# Patient Record
Sex: MALE | Race: WHITE | Employment: UNEMPLOYED | URBAN - METROPOLITAN AREA
[De-identification: names, ages, dates, MRNs, and addresses within clinical notes are randomized per-mention and may not be internally consistent; named-entity substitution may affect disease eponyms.]

---

## 2017-01-06 ENCOUNTER — OFFICE VISIT (OUTPATIENT)
Dept: INTERNAL MEDICINE CLINIC | Facility: CLINIC | Age: 32
End: 2017-01-06

## 2017-01-06 VITALS
HEART RATE: 88 BPM | WEIGHT: 132 LBS | SYSTOLIC BLOOD PRESSURE: 148 MMHG | RESPIRATION RATE: 18 BRPM | HEIGHT: 70 IN | BODY MASS INDEX: 18.9 KG/M2 | TEMPERATURE: 96 F | DIASTOLIC BLOOD PRESSURE: 81 MMHG

## 2017-01-06 DIAGNOSIS — F43.10 PTSD (POST-TRAUMATIC STRESS DISORDER): ICD-10-CM

## 2017-01-06 DIAGNOSIS — M19.90 ARTHRITIS: ICD-10-CM

## 2017-01-06 DIAGNOSIS — F41.1 GENERALIZED ANXIETY DISORDER: Primary | ICD-10-CM

## 2017-01-06 RX ORDER — DIAZEPAM 10 MG/1
10 TABLET ORAL
Qty: 45 TAB | Refills: 0 | Status: SHIPPED | OUTPATIENT
Start: 2017-01-06 | End: 2017-01-06 | Stop reason: DRUGHIGH

## 2017-01-06 RX ORDER — DIAZEPAM 10 MG/1
TABLET ORAL
Qty: 45 TAB | Refills: 0
Start: 2017-01-06 | End: 2017-02-07 | Stop reason: SDUPTHER

## 2017-01-06 NOTE — MR AVS SNAPSHOT
Visit Information Date & Time Provider Department Dept. Phone Encounter #  
 1/6/2017 10:15 AM Malika Alvarez NP [facility] Internal Medicine [phone] [id number] Follow-up Instructions Return in about 3 months (around 4/6/2017) for Anxiety, Schedule your annual physical with fasting labs. Upcoming Health Maintenance Date Due DTaP/Tdap/Td series (1 - Tdap) 7/29/2005 Allergies as of 1/6/2017  Review Complete On: 1/6/2017 By: Malika Alvarez NP No Known Allergies Current Immunizations  Never Reviewed No immunizations on file. Not reviewed this visit You Were Diagnosed With   
  
 Codes Comments Generalized anxiety disorder    -  Primary ICD-10-CM: F41.1 ICD-9-CM: 300.02 PTSD (post-traumatic stress disorder)     ICD-10-CM: F43.10 ICD-9-CM: 309.81 Vitals BP Pulse Temp Resp Height(growth percentile) Weight(growth percentile) 148/81 (BP 1 Location: Left arm, BP Patient Position: Sitting) 88 96 °F (35.6 °C) (Oral) 18 5' 10\" (1.778 m) 132 lb (59.9 kg) BMI Smoking Status 18.94 kg/m2 Never Smoker Vitals History BMI and BSA Data Body Mass Index Body Surface Area 18.94 kg/m 2 1.72 m 2 Preferred Pharmacy Pharmacy Name Phone [id number] [address] [phone] Your Updated Medication List  
  
   
This list is accurate as of: 1/6/17 10:45 AM.  Always use your most recent med list.  
  
  
  
  
 diazePAM 10 mg tablet Commonly known as:  VALIUM Take 1 Tab by mouth every six (6) hours as needed for Anxiety. Max Daily Amount: 40 mg.  
  
  
  
  
Prescriptions Printed Refills  
 diazePAM (VALIUM) 10 mg tablet 0 Sig: Take 1 Tab by mouth every six (6) hours as needed for Anxiety. Max Daily Amount: 40 mg.  
 Class: Print Route: Oral  
  
Follow-up Instructions Return in about 3 months (around 4/6/2017) for Anxiety, Schedule your annual physical with fasting labs. Introducing [facility]! [employer] introduces [employer] patient portal. Now you can access parts of your medical record, email your doctor's office, and request medication refills online. 1. In your internet browser, go to [email or web address] 2. Click on the First Time User? Click Here link in the Sign In box. You will see the New Member Sign Up page. 3. Enter your [employer] Access Code exactly as it appears below. You will not need to use this code after youve completed the sign-up process. If you do not sign up before the expiration date, you must request a new code. · [employer] Access Code: [email or web address] Expires: 4/6/2017 10:45 AM 
 
4. Enter the last four digits of your Social Security Number (xxxx) and Date of Birth (mm/dd/yyyy) as indicated and click Submit. You will be taken to the next sign-up page. 5. Create a [employer] ID. This will be your [employer] login ID and cannot be changed, so think of one that is secure and easy to remember. 6. Create a [employer] password. You can change your password at any time. 7. Enter your Password Reset Question and Answer. This can be used at a later time if you forget your password. 8. Enter your e-mail address. You will receive e-mail notification when new information is available in [address]. 9. Click Sign Up. You can now view and download portions of your medical record. 10. Click the Download Summary menu link to download a portable copy of your medical information. If you have questions, please visit the Frequently Asked Questions section of the [employer] website. Remember, [employer] is NOT to be used for urgent needs. For medical emergencies, dial 911. Now available from your iPhone and Android! Please provide this summary of care documentation to your next provider. Your primary care clinician is listed as Lenora Youngblood. If you have any questions after today's visit, please call [phone].

## 2017-01-06 NOTE — PROGRESS NOTES
Subjective:      Keyanna Chris is a 27 y.o. male who presents today for anxiety follow up. Mental Health Review  Patient is seen for anxiety disorder. Since last visit: he has been consistently taking valium. Ongoing symptoms include: racing thoughts, feelings of losing control. He denies: SI/SA. Reported side effects from the treatment: none. He states that he feels well controlled on valium and that sometime he feels like he might need to take an additional 5mg. He states he prefers to have the 10mg tablets so he can take half, this way he knows if he has taken it or not. VAPMP reviewed. He notes arthritis to bilateral knees and right wrist.     Patient Active Problem List    Diagnosis Date Noted    PTSD (post-traumatic stress disorder) 10/25/2016    Generalized anxiety disorder 10/25/2016     Current Outpatient Prescriptions   Medication Sig Dispense Refill    diazePAM (VALIUM) 10 mg tablet Take a 0.5 tab as needed for anxiety two times a day. You may take an additional 0.5 tab as needed for days when the anxiety is not well controlled. 45 Tab 0     No Known Allergies  History reviewed. No pertinent past medical history. Family History   Problem Relation Age of Onset    Hypertension Mother     Diabetes Mother     Hypertension Father      Social History   Substance Use Topics    Smoking status: Never Smoker    Smokeless tobacco: Not on file    Alcohol use No        Review of Systems    A comprehensive review of systems was negative except for that written in the HPI.      Objective:     Visit Vitals    /81 (BP 1 Location: Left arm, BP Patient Position: Sitting)    Pulse 88    Temp 96 °F (35.6 °C) (Oral)    Resp 18    Ht 5' 10\" (1.778 m)    Wt 132 lb (59.9 kg)    BMI 18.94 kg/m2     General appearance: alert, cooperative, no distress, appears stated age  Lungs: clear to auscultation bilaterally  Heart: regular rate and rhythm, S1, S2 normal, no murmur, click, rub or gallop  Neurologic: Grossly normal  Psych: bizarre affect, eye contact avoidance. Nursing note and vitals reviewed  Assessment/Plan:   1. Generalized anxiety disorder  - refilled valium with 10mg so he will take 1/2 tabs. He will make apt today with psych  - diazePAM (VALIUM) 10 mg tablet; Take a 0.5 tab as needed for anxiety two times a day. You may take an additional 0.5 tab as needed for days when the anxiety is not well controlled. Dispense: 45 Tab; Refill: 0    2. PTSD (post-traumatic stress disorder)    Follow-up Disposition:  Return in about 3 months (around 4/6/2017) for Anxiety, Schedule your annual physical with fasting labs. Advised him to call back or return to office if symptoms worsen/change/persist.  Discussed expected course/resolution/complications of diagnosis in detail with patient. Medication risks/benefits/costs/interactions/alternatives discussed with patient. He was given an after visit summary which includes diagnoses, current medications, & vitals. He expressed understanding with the diagnosis and plan.

## 2017-01-06 NOTE — PROGRESS NOTES
Chief Complaint   Patient presents with    Anxiety     1. Have you been to the ER, urgent care clinic since your last visit? Hospitalized since your last visit? No    2. Have you seen or consulted any other health care providers outside of the [facility] since your last visit? Include any pap smears or colon screening.  No

## 2017-01-19 ENCOUNTER — TELEPHONE (OUTPATIENT)
Dept: INTERNAL MEDICINE CLINIC | Facility: CLINIC | Age: 32
End: 2017-01-19

## 2017-01-19 NOTE — TELEPHONE ENCOUNTER
Patient called stating that [facility] is currently not accepting any new patients. Therefore, patient needs to be referred to another provider. Please advise!

## 2017-02-03 ENCOUNTER — OFFICE VISIT (OUTPATIENT)
Dept: INTERNAL MEDICINE CLINIC | Facility: CLINIC | Age: 32
End: 2017-02-03

## 2017-02-03 VITALS
TEMPERATURE: 97.4 F | WEIGHT: 130.6 LBS | HEART RATE: 81 BPM | BODY MASS INDEX: 18.7 KG/M2 | HEIGHT: 70 IN | OXYGEN SATURATION: 99 % | SYSTOLIC BLOOD PRESSURE: 111 MMHG | DIASTOLIC BLOOD PRESSURE: 84 MMHG | RESPIRATION RATE: 18 BRPM

## 2017-02-03 DIAGNOSIS — N50.89 GENITAL LESION, MALE: ICD-10-CM

## 2017-02-03 DIAGNOSIS — M19.90 INFLAMMATORY ARTHRITIS: ICD-10-CM

## 2017-02-03 DIAGNOSIS — B35.4 TINEA CORPORIS: ICD-10-CM

## 2017-02-03 DIAGNOSIS — Z00.01 ENCOUNTER FOR GENERAL ADULT MEDICAL EXAMINATION WITH ABNORMAL FINDINGS: Primary | ICD-10-CM

## 2017-02-03 DIAGNOSIS — Z86.19 HISTORY OF HEPATITIS C: ICD-10-CM

## 2017-02-03 DIAGNOSIS — L40.50 PSORIATIC ARTHRITIS (HCC): ICD-10-CM

## 2017-02-03 DIAGNOSIS — L81.5 LEUKODERMA: ICD-10-CM

## 2017-02-03 DIAGNOSIS — M19.90 CHRONIC ARTHRITIS: ICD-10-CM

## 2017-02-03 DIAGNOSIS — Z23 ENCOUNTER FOR IMMUNIZATION: ICD-10-CM

## 2017-02-03 RX ORDER — KETOCONAZOLE 20 MG/G
CREAM TOPICAL DAILY
Qty: 15 G | Refills: 0 | Status: SHIPPED | OUTPATIENT
Start: 2017-02-03

## 2017-02-03 NOTE — PATIENT INSTRUCTIONS
Learning About Living Valdez  What is a living will? A living will is a legal form you use to write down the kind of care you want at the end of your life. It is used by the health professionals who will treat you if you aren't able to decide for yourself. If you put your wishes in writing, your loved ones and others will know what kind of care you want. They won't need to guess. This can ease your mind and be helpful to others. A living will is not the same as an estate or property will. An estate will explains what you want to happen with your money and property after you die. Is a living will a legal document? A living will is a legal document. Each state has its own laws about living almanza. If you move to another state, make sure that your living will is legal in the state where you now live. Or you might use a universal form that has been approved by many states. This kind of form can sometimes be completed and stored online. Your electronic copy will then be available wherever you have a connection to the Internet. In most cases, doctors will respect your wishes even if you have a form from a different state. · You don't need an  to complete a living will. But legal advice can be helpful if your state's laws are unclear, your health history is complicated, or your family can't agree on what should be in your living will. · You can change your living will at any time. Some people find that their wishes about end-of-life care change as their health changes. · In addition to making a living will, think about completing a medical power of  form. This form lets you name the person you want to make end-of-life treatment decisions for you (your \"health care agent\") if you're not able to. Many hospitals and nursing homes will give you the forms you need to complete a living will and a medical power of .   · Your living will is used only if you can't make or communicate decisions for yourself anymore. If you become able to make decisions again, you can accept or refuse any treatment, no matter what you wrote in your living will. · Your state may offer an online registry. This is a place where you can store your living will online so the doctors and nurses who need to treat you can find it right away. What should you think about when creating a living will? Talk about your end-of-life wishes with your family members and your doctor. Let them know what you want. That way the people making decisions for you won't be surprised by your choices. Think about these questions as you make your living will:  · Do you know enough about life support methods that might be used? If not, talk to your doctor so you know what might be done if you can't breathe on your own, your heart stops, or you're unable to swallow. · What things would you still want to be able to do after you receive life-support methods? Would you want to be able to walk? To speak? To eat on your own? To live without the help of machines? · If you have a choice, where do you want to be cared for? In your home? At a hospital or nursing home? · Do you want certain Sikh practices performed if you become very ill? · If you have a choice at the end of your life, where would you prefer to die? At home? In a hospital or nursing home? Somewhere else? · Would you prefer to be buried or cremated? · Do you want your organs to be donated after you die? What should you do with your living will? · Make sure that your family members and your health care agent have copies of your living will. · Give your doctor a copy of your living will to keep in your medical record. If you have more than one doctor, make sure that each one has a copy. · You may want to put a copy of your living will where it can be easily found. Where can you learn more? Go to http://gretta-barrie.info/.   Enter I623 in the search box to learn more about \"Learning About Franklin [employer]. \"  Current as of: February 24, 2016  Content Version: 11.1  © 5375-2648 [employer], [employer]. Care instructions adapted under license by [facility] (which disclaims liability or warranty for this information). If you have questions about a medical condition or this instruction, always ask your healthcare professional. [address] any warranty or liability for your use of this information.

## 2017-02-03 NOTE — PROGRESS NOTES
Subjective:      Elizabeth Us is a 27 y.o. male who presents today for CPE. He had coffee with cream/sugar but states it was a very small amount. He states he also has a history of Hep C that has been treated and cleared. Health Maintenance  Immunizations:    Influenza: patient declines, reviewed pros/cons to vaccination & recommended it. Tetanus: not UTD- will do today. Cancer screening:    Reviewed testicular, prostate, and colon cancer screening guidelines. Hand pain: history of arthritis, hand pain that is chronic and rated 4/10. He does not take medications for this. He states he wants to be tested for RA since his dad and granfather have it. Skin concern: spot on left forearm that is new and elevated, no other sx. Penis color change to shaft that is itching and sometimes burning. Dry skin to left side of forehead that has been present for years. Patient Care Team:  Elena Hawthorne NP as PCP - General (Nurse Practitioner)     The following sections were reviewed & updated as appropriate: PMH, PSH, FH, and SH. Patient Active Problem List   Diagnosis Code    PTSD (post-traumatic stress disorder) F43.10    Generalized anxiety disorder F41.1    Arthritis M19.90          Prior to Admission medications    Medication Sig Start Date End Date Taking? Authorizing Provider   diazePAM (VALIUM) 10 mg tablet Take a 0.5 tab as needed for anxiety two times a day. You may take an additional 0.5 tab as needed for days when the anxiety is not well controlled. 1/6/17  Yes Elena Hawthorne NP          No Known Allergies       Family History   Problem Relation Age of Onset    Hypertension Mother     Diabetes Mother     Hypertension Father      Social History   Substance Use Topics    Smoking status: Never Smoker    Smokeless tobacco: Never Used    Alcohol use No        Review of Systems    A comprehensive review of systems was negative except for that written in the HPI.      Objective: Visit Vitals    /84 (BP 1 Location: Left arm, BP Patient Position: Sitting)    Pulse 81    Temp 97.4 °F (36.3 °C) (Oral)    Resp 18    Ht 5' 10\" (1.778 m)    Wt 130 lb 9.6 oz (59.2 kg)    SpO2 99%    BMI 18.74 kg/m2     General:  Alert, cooperative, no distress, appears stated age. Head:  Normocephalic, without obvious abnormality, atraumatic. Eyes:  Conjunctivae/corneas clear. PERRL, EOMs intact. Ears:  Normal TMs and external ear canals both ears. Nose: Nares normal. Septum midline. Mucosa normal. No drainage or sinus tenderness. Throat: Lips, mucosa, and tongue normal. Teeth and gums normal.   Neck: Supple, symmetrical, trachea midline, no adenopathy, thyroid: no enlargement/tenderness/nodules, no carotid bruit and no JVD. Back:   Symmetric, no curvature. ROM normal. No CVA tenderness. Lungs:   Clear to auscultation bilaterally. Chest wall:  No tenderness or deformity. Heart:  Regular rate and rhythm, S1, S2 normal, no murmur, click, rub or gallop. Abdomen:   Soft, non-tender. Bowel sounds normal. No masses,  No organomegaly. Genitalia:  Normal male circumsized male with hypopigmentation noted to shaft. no ulceration, discharge or tenderness. Rectal:  Deferred   Extremities: Extremities normal, atraumatic, no cyanosis or edema. Pulses: 2+ and symmetric all extremities. Skin: Small about 1mm lesion noted to left forearm, elevated papule with flaking skin, no disharge. Left side of forehead hair line with dry flaking skin   Lymph nodes: Cervical, supraclavicular  nodes normal.   Neurologic: CNII-XII intact. Normal strength, sensation throughout. Nursing note and vitals reviewed  Assessment/Plan:   1. Encounter for general adult medical examination with abnormal findings  - LIPID PANEL  - CBC WITH AUTOMATED DIFF  - METABOLIC PANEL, COMPREHENSIVE    2.  Encounter for immunization  - Tetanus, diphtheria toxoids and acellular pertussis (TDAP) vaccine, in individuals >=7 years, IM    3. Chronic arthritis  - RHEUMATOID FACTOR, QL  - CAESAR QL, W/REFLEX CASCADE    4. Inflammatory arthritis  - RHEUMATOID FACTOR, QL  - CAESAR QL, W/REFLEX CASCADE    5. Tinea corporis  - ketoconazole (NIZORAL) 2 % topical cream; Apply  to affected area daily. Dispense: 15 g; Refill: 0    6. Genital lesion, male  7. Leukoderma  - to penis, he will watch sx and call next week to report changes    8. Psoriatic arthritis ([facility] [address]  - advised high emollient topical treatment    9. History of hepatitis C  - HEPATITIS C AB  - HCV RNA NORM QUALITATIVE    Follow-up Disposition:  Return in about 3 months (around 5/3/2017). Advised him to call back or return to office if symptoms worsen/change/persist.  Discussed expected course/resolution/complications of diagnosis in detail with patient. Medication risks/benefits/costs/interactions/alternatives discussed with patient. He was given an after visit summary which includes diagnoses, current medications, & vitals. He expressed understanding with the diagnosis and plan.

## 2017-02-03 NOTE — PROGRESS NOTES
Room 5    Chief Complaint   Patient presents with    Complete Physical     Patient states he had coffee with cream and sugar     1. Have you been to the ER, urgent care clinic since your last visit? Hospitalized since your last visit? No    2. Have you seen or consulted any other health care providers outside of the [address] [place] [address] since your last visit? Include any pap smears or colon screening. No     Health Maintenance Due   Topic Date Due    DTaP/Tdap/Td series (1 - Tdap) 07/29/2005     Reviewed advance directives/living will. Patient does not have one in place.   Information attached to patient's AVS

## 2017-02-03 NOTE — MR AVS SNAPSHOT
Visit Information Date & Time Provider Department Dept. Phone Encounter #  
 2/3/2017  9:45 AM Tatyana Latham, [address] Internal Medicine [phone] [id number] Follow-up Instructions Return in about 3 months (around 5/3/2017). Upcoming Health Maintenance Date Due DTaP/Tdap/Td series (1 - Tdap) 7/29/2005 Allergies as of 2/3/2017  Review Complete On: 2/3/2017 By: Tatyana Latham NP No Known Allergies Current Immunizations  Never Reviewed Name Date Tdap  Incomplete Not reviewed this visit You Were Diagnosed With   
  
 Codes Comments Encounter for general adult medical examination with abnormal findings    -  Primary ICD-10-CM: Z00.01 
ICD-9-CM: V70.0 Encounter for immunization     ICD-10-CM: G67 ICD-9-CM: V03.89 Chronic arthritis     ICD-10-CM: M19.90 ICD-9-CM: 716.90 Inflammatory arthritis     ICD-10-CM: M19.90 ICD-9-CM: 714.9 Tinea corporis     ICD-10-CM: B35.4 ICD-9-CM: 110.5 Genital lesion, male     ICD-10-CM: N50.89 ICD-9-CM: 608.89 Leukoderma     ICD-10-CM: L81.5 ICD-9-CM: 709.09   
 Psoriatic arthritis ([facility] [address]     ICD-10-CM: L40.50 ICD-9-CM: 696.0 Vitals BP Pulse Temp Resp Height(growth percentile) Weight(growth percentile) 111/84 (BP 1 Location: Left arm, BP Patient Position: Sitting) 81 97.4 °F (36.3 °C) (Oral) 18 5' 10\" (1.778 m) 130 lb 9.6 oz (59.2 kg) SpO2 BMI Smoking Status 99% 18.74 kg/m2 Never Smoker Vitals History BMI and BSA Data Body Mass Index Body Surface Area 18.74 kg/m 2 1.71 m 2 Preferred Pharmacy Pharmacy Name Phone [facility]/PHARMACY #[id number]- [place], [place] - [address] [phone] Your Updated Medication List  
  
   
This list is accurate as of: 2/3/17 11:10 AM.  Always use your most recent med list.  
  
  
  
  
 diazePAM 10 mg tablet Commonly known as:  VALIUM  
 Take a 0.5 tab as needed for anxiety two times a day. You may take an additional 0.5 tab as needed for days when the anxiety is not well controlled. ketoconazole 2 % topical cream  
Commonly known as:  NIZORAL Apply  to affected area daily. Prescriptions Sent to Pharmacy Refills  
 ketoconazole (NIZORAL) 2 % topical cream 0 Sig: Apply  to affected area daily. Class: Normal  
 Pharmacy: [facility]/pharmacy [address] [facility] [address]  #: [phone] Route: Topical  
  
We Performed the Following CAESAR QL, W/REFLEX CASCADE [[id number] Custom] CBC WITH AUTOMATED DIFF [08823 CPT(R)] LIPID PANEL [69238 CPT(R)] METABOLIC PANEL, COMPREHENSIVE [97942 CPT(R)] RHEUMATOID FACTOR, QL [id number] CPT(R)] TETANUS, DIPHTHERIA TOXOIDS AND ACELLULAR PERTUSSIS VACCINE (TDAP), IN INDIVIDS. >=7, IM [id number] CPT(R)] Follow-up Instructions Return in about 3 months (around 5/3/2017). Patient Instructions Dorita Sin 1721 What is a living will? A living will is a legal form you use to write down the kind of care you want at the end of your life. It is used by the health professionals who will treat you if you aren't able to decide for yourself. If you put your wishes in writing, your loved ones and others will know what kind of care you want. They won't need to guess. This can ease your mind and be helpful to others. A living will is not the same as an estate or property will. An estate will explains what you want to happen with your money and property after you die. Is a living will a legal document? A living will is a legal document. Each state has its own laws about living almanza. If you move to another state, make sure that your living will is legal in the state where you now live. Or you might use a universal form that has been approved by many states.  This kind of form can sometimes be completed and stored online. Your electronic copy will then be available wherever you have a connection to the Internet. In most cases, doctors will respect your wishes even if you have a form from a different state. · You don't need an  to complete a living will. But legal advice can be helpful if your state's laws are unclear, your health history is complicated, or your family can't agree on what should be in your living will. · You can change your living will at any time. Some people find that their wishes about end-of-life care change as their health changes. · In addition to making a living will, think about completing a medical power of  form. This form lets you name the person you want to make end-of-life treatment decisions for you (your \"health care agent\") if you're not able to. Many hospitals and nursing homes will give you the forms you need to complete a living will and a medical power of . · Your living will is used only if you can't make or communicate decisions for yourself anymore. If you become able to make decisions again, you can accept or refuse any treatment, no matter what you wrote in your living will. · Your state may offer an online registry. This is a place where you can store your living will online so the doctors and nurses who need to treat you can find it right away. What should you think about when creating a living will? Talk about your end-of-life wishes with your family members and your doctor. Let them know what you want. That way the people making decisions for you won't be surprised by your choices. Think about these questions as you make your living will: · Do you know enough about life support methods that might be used? If not, talk to your doctor so you know what might be done if you can't breathe on your own, your heart stops, or you're unable to swallow.  
· What things would you still want to be able to do after you receive life-support methods? Would you want to be able to walk? To speak? To eat on your own? To live without the help of machines? · If you have a choice, where do you want to be cared for? In your home? At a hospital or nursing home? · Do you want certain Cheondoism practices performed if you become very ill? · If you have a choice at the end of your life, where would you prefer to die? At home? In a hospital or nursing home? Somewhere else? · Would you prefer to be buried or cremated? · Do you want your organs to be donated after you die? What should you do with your living will? · Make sure that your family members and your health care agent have copies of your living will. · Give your doctor a copy of your living will to keep in your medical record. If you have more than one doctor, make sure that each one has a copy. · You may want to put a copy of your living will where it can be easily found. Where can you learn more? Go to http://gretta-barrie.info/. Enter R235 in the search box to learn more about \"Learning About Living Perroy. \" Current as of: February 24, 2016 Content Version: 11.1 © 3278-6253 [employer], [employer]. Care instructions adapted under license by [employer] (which disclaims liability or warranty for this information). If you have questions about a medical condition or this instruction, always ask your healthcare professional. [place] [address] any warranty or liability for your use of this information. Introducing [facility] & HEALTH SERVICES! [facility] introduces ZoomInfo patient portal. Now you can access parts of your medical record, email your doctor's office, and request medication refills online. 1. In your internet browser, go to [email or web address] 2. Click on the First Time User? Click Here link in the Sign In box. You will see the New Member Sign Up page. 3. Enter your Ourcast Access Code exactly as it appears below. You will not need to use this code after youve completed the sign-up process. If you do not sign up before the expiration date, you must request a new code. · Ourcast Access Code: [email or web address] Expires: 4/6/2017 10:45 AM 
 
4. Enter the last four digits of your Social Security Number (xxxx) and Date of Birth (mm/dd/yyyy) as indicated and click Submit. You will be taken to the next sign-up page. 5. Create a Ourcast ID. This will be your Ourcast login ID and cannot be changed, so think of one that is secure and easy to remember. 6. Create a Ourcast password. You can change your password at any time. 7. Enter your Password Reset Question and Answer. This can be used at a later time if you forget your password. 8. Enter your e-mail address. You will receive e-mail notification when new information is available in [address] 9. Click Sign Up. You can now view and download portions of your medical record. 10. Click the Download Summary menu link to download a portable copy of your medical information. If you have questions, please visit the Frequently Asked Questions section of the Ourcast website. Remember, Ourcast is NOT to be used for urgent needs. For medical emergencies, dial 911. Now available from your iPhone and Android! Please provide this summary of care documentation to your next provider. Your primary care clinician is listed as Humberto Cleveland. If you have any questions after today's visit, please call [phone].

## 2017-02-07 DIAGNOSIS — F41.1 GENERALIZED ANXIETY DISORDER: ICD-10-CM

## 2017-02-08 RX ORDER — DIAZEPAM 10 MG/1
TABLET ORAL
Qty: 45 TAB | Refills: 0 | Status: SHIPPED | OUTPATIENT
Start: 2017-02-08 | End: 2017-03-13 | Stop reason: SDUPTHER

## 2017-03-01 ENCOUNTER — OFFICE VISIT (OUTPATIENT)
Dept: INTERNAL MEDICINE CLINIC | Facility: CLINIC | Age: 32
End: 2017-03-01

## 2017-03-01 VITALS
HEIGHT: 70 IN | HEART RATE: 91 BPM | BODY MASS INDEX: 18.61 KG/M2 | TEMPERATURE: 98.2 F | RESPIRATION RATE: 18 BRPM | DIASTOLIC BLOOD PRESSURE: 71 MMHG | SYSTOLIC BLOOD PRESSURE: 109 MMHG | WEIGHT: 130 LBS

## 2017-03-01 DIAGNOSIS — K04.7 DENTAL ABSCESS: Primary | ICD-10-CM

## 2017-03-01 DIAGNOSIS — K08.89 PAIN, DENTAL: ICD-10-CM

## 2017-03-01 DIAGNOSIS — L98.9 SKIN LESION: ICD-10-CM

## 2017-03-01 RX ORDER — AMOXICILLIN AND CLAVULANATE POTASSIUM 875; 125 MG/1; MG/1
1 TABLET, FILM COATED ORAL EVERY 12 HOURS
Qty: 14 TAB | Refills: 0 | Status: SHIPPED | OUTPATIENT
Start: 2017-03-01 | End: 2017-05-02

## 2017-03-01 RX ORDER — OXYCODONE AND ACETAMINOPHEN 5; 325 MG/1; MG/1
1 TABLET ORAL
Qty: 15 TAB | Refills: 0 | Status: SHIPPED | OUTPATIENT
Start: 2017-03-01 | End: 2017-05-02

## 2017-03-01 NOTE — MR AVS SNAPSHOT
Visit Information Date & Time Provider Department Dept. Phone Encounter #  
 3/1/2017  2:30 PM Phillip Salinas NP [facility] Internal Medicine [phone] [id number] Follow-up Instructions Return if symptoms worsen or fail to improve. Upcoming Health Maintenance Date Due DTaP/Tdap/Td series (2 - Td) 2/3/2027 Allergies as of 3/1/2017  Review Complete On: 3/1/2017 By: Phillip Salinas NP No Known Allergies Current Immunizations  Reviewed on 2/3/2017 Name Date Tdap 2/3/2017 Not reviewed this visit You Were Diagnosed With   
  
 Codes Comments Dental abscess    -  Primary ICD-10-CM: K04.7 ICD-9-CM: 522.5 Pain, dental     ICD-10-CM: S67.25 ICD-9-CM: 525.9 Vitals BP  
  
  
  
  
  
 109/71 (BP 1 Location: Left arm, BP Patient Position: Sitting) Vitals History BMI and BSA Data Body Mass Index Body Surface Area  
 18.65 kg/m 2 1.71 m 2 Preferred Pharmacy Pharmacy Name Phone [facility]/PHARMACY #[id number] [address] [phone] Your Updated Medication List  
  
   
This list is accurate as of: 3/1/17  3:06 PM.  Always use your most recent med list.  
  
  
  
  
 amoxicillin-clavulanate 875-125 mg per tablet Commonly known as:  AUGMENTIN Take 1 Tab by mouth every twelve (12) hours. diazePAM 10 mg tablet Commonly known as:  VALIUM Take a 0.5 tab as needed for anxiety two times a day. You may take an additional 0.5 tab as needed for days when the anxiety is not well controlled. ketoconazole 2 % topical cream  
Commonly known as:  NIZORAL Apply  to affected area daily. oxyCODONE-acetaminophen 5-325 mg per tablet Commonly known as:  PERCOCET Take 1 Tab by mouth every four (4) hours as needed for Pain. Max Daily Amount: 6 Tabs. Prescriptions Printed Refills oxyCODONE-acetaminophen (PERCOCET) 5-325 mg per tablet 0 Sig: Take 1 Tab by mouth every four (4) hours as needed for Pain. Max Daily Amount: 6 Tabs. Class: Print Route: Oral  
  
Prescriptions Sent to Pharmacy Refills  
 amoxicillin-clavulanate (AUGMENTIN) 875-125 mg per tablet 0 Sig: Take 1 Tab by mouth every twelve (12) hours. Class: Normal  
 Pharmacy: [facility]/pharmacy [address] [facility] [address]  #: [phone] Route: Oral  
  
Follow-up Instructions Return if symptoms worsen or fail to improve. Patient Instructions [employer] C/ [place] [address]: [phone] 
-there is a cost for the appointment which covers an xray and exam. Acceptance 
into the clinic is determined by the faculty on the needs of the patient and 
educational needs of the student. Cost is less than private practice and payment 
is expected at the time of treatment. No sliding financial scale is available. [facility].: [phone] 
-this clinic only does teeth extraction. Payment is dependent on financial 
screening and a sliding scale of what the patient can afford. Daily Plant: [phone] [facility] [phone] 
-multiple treatment options. Payment is based on a persons income and what 
they can afford to pay. [address] [place] [address] Avenue: [phone] 
-multiple treatment options. Payment is based on financial screening and a 
sliding scale of what the patient can afford. [facility]: [phone] or [phone] 
Tyler Garner, [address] [facility] Only does extractions, partials, and dentures. Reasonable rates. Anywhere in the state: 
[facility]: [phone] 
-Only for patients >62 or those that are on disability [place]: ([address] [facility] [address] [facility]) Mehdi: [phone] 
-[address] [facility]. Patients must live in the [facility] 
 Art Mendoza, or [place]. Willie: ([address]) [facility]: [phone] 
-only for residents of the [facility] area and must be patients of the medical 
clinic in the same area. [place]: ([address]) Erin Low 
-based on financial screening and a sliding scale of what the patient can afford. [place]: ([address]) [address] [place] [address] Adult Dental Clinic: [phone] [facility]: ([address]) [address] [place] [address] Avenue: [phone] 
-this clinic only does extractions (removal) and restorations (fillings) [place]: ([facility] [address]) [facility]: [phone] Abscessed Tooth: Care Instructions Your Care Instructions An abscessed tooth is a tooth that has a pocket of pus in the tissues around it. Pus forms when the body tries to fight an infection caused by bacteria. If the pus cannot drain, it forms an abscess. An abscessed tooth can cause red, swollen gums and throbbing pain, especially when you chew. You may have a bad taste in your mouth and a fever, and your jaw may swell. Damage to the tooth, untreated tooth decay, or gum disease can cause an abscessed tooth. An abscessed tooth needs to be treated by a dental professional right away. If it is not treated, the infection could spread to other parts of your body. Your dentist will give you antibiotics to stop the infection. He or she may make a hole in the tooth or cut open (ramón) the abscess inside your mouth so that the infection can drain, which should relieve your pain. You may need to have a root canal treatment, which tries to save your tooth by taking out the infected pulp and replacing it with a healing medicine and/or a filling. If these treatments do not work, your tooth may have to be removed. Follow-up care is a key part of your treatment and safety.  Be sure to make and go to all appointments, and call your doctor if you are having problems. It's also a good idea to know your test results and keep a list of the medicines you take. How can you care for yourself at home? · Reduce pain and swelling in your face and jaw by putting ice or a cold pack on the outside of your cheek for 10 to 20 minutes at a time. Put a thin cloth between the ice and your skin. · Take pain medicines exactly as directed. ¨ If the doctor gave you a prescription medicine for pain, take it as prescribed. ¨ If you are not taking a prescription pain medicine, ask your doctor if you can take an over-the-counter medicine. · Take your antibiotics as directed. Do not stop taking them just because you feel better. You need to take the full course of antibiotics. To prevent tooth abscess · Brush and floss every day, and have regular dental checkups. · Eat a healthy diet, and avoid sugary foods and drinks. · Do not smoke or use spit tobacco. Tobacco use slows your ability to heal. It also increases your risk for gum disease and cancer of the mouth and throat. If you need help quitting, talk to your doctor about stop-smoking programs and medicines. These can increase your chances of quitting for good. When should you call for help? Call 911 anytime you think you may need emergency care. For example, call if: 
· You have trouble breathing. Call your doctor now or seek immediate medical care if: 
· You are dizzy or lightheaded, or you feel like you may faint. · You have a new or higher fever. · You have swelling, redness, or pain that spreads or gets worse. · You have pus coming from the tooth area. · You have an earache or pain behind your ear. · You have a fever with a stiff neck or a severe headache. · You are sensitive to light or feel very sleepy or confused. Watch closely for changes in your health, and be sure to contact your doctor if: 
· You do not get better as expected. Where can you learn more? Go to http://gretta-barrie.info/. Enter F940 in the search box to learn more about \"Abscessed Tooth: Care Instructions. \" Current as of: August 9, 2016 Content Version: 11.1 © 7799-8954 [employer] Universe, Incorporated. Care instructions adapted under license by [employer] (which disclaims liability or warranty for this information). If you have questions about a medical condition or this instruction, always ask your healthcare professional. [address] any warranty or liability for your use of this information. Introducing [facility] & HEALTH SERVICES! [facility] introduces [employer]P [employer] patient portal. Now you can access parts of your medical record, email your doctor's office, and request medication refills online. 1. In your internet browser, go to [email or web address] 2. Click on the First Time User? Click Here link in the Sign In box. You will see the New Member Sign Up page. 3. Enter your [employer] Access Code exactly as it appears below. You will not need to use this code after youve completed the sign-up process. If you do not sign up before the expiration date, you must request a new code. · [employer] Access Code: [email or web address] Expires: 4/6/2017 10:45 AM 
 
4. Enter the last four digits of your Social Security Number (xxxx) and Date of Birth (mm/dd/yyyy) as indicated and click Submit. You will be taken to the next sign-up page. 5. Create a SmartExposeet ID. This will be your K[employer]P [employer] login ID and cannot be changed, so think of one that is secure and easy to remember. 6. Create a [employer] password. You can change your password at any time. 7. Enter your Password Reset Question and Answer. This can be used at a later time if you forget your password. 8. Enter your e-mail address. You will receive e-mail notification when new information is available in [address] 9. Click Sign Up. You can now view and download portions of your medical record. 10. Click the Download Summary menu link to download a portable copy of your medical information. If you have questions, please visit the Frequently Asked Questions section of the FindTheBest website. Remember, FindTheBest is NOT to be used for urgent needs. For medical emergencies, dial 911. Now available from your iPhone and Android! Please provide this summary of care documentation to your next provider. Your primary care clinician is listed as Keshave Presser. If you have any questions after today's visit, please call [phone].

## 2017-03-01 NOTE — PATIENT INSTRUCTIONS
[address]: [phone]  -there is a cost for the appointment which covers an xray and exam. Acceptance  into the clinic is determined by the faculty on the needs of the patient and  educational needs of the student. Cost is less than private practice and payment  is expected at the time of treatment. No sliding financial scale is available. [address] [facility] [phone]  -this clinic only does teeth extraction. Payment is dependent on financial  screening and a sliding scale of what the patient can afford. [facility]: [phone] -[address] [facility]    Mell[address].: [phone]  -multiple treatment options. Payment is based on a persons income and what  they can afford to pay. [address]: [phone]  -multiple treatment options. Payment is based on financial screening and a  sliding scale of what the patient can afford. [facility]: [phone] or [phone]  [place], [place]. Only does extractions, partials, and dentures. Reasonable rates. Anywhere in the state:  [facility]: [phone]  -Only for patients >62 or those that are on disability  Louise: ([address] [facility])  [facility]: [phone]  -[address] Patients must live in the [facility], or [facility]. [place]wick: ([address])  [address]: [phone]  -only for residents of the [facility] area and must be patients of the medical  clinic in the same area. David: ([address])  [address]  -based on financial screening and a sliding scale of what the patient can afford.   Joan: ([address])  [address] [facility]: [address]: ([address])  [address]: [phone]  -this clinic only does extractions (removal) and restorations (fillings)  Ulysses: [facility] [address] [facility])  [facility] [place] - INPATIENT: [phone]       Abscessed Tooth: Care Instructions  Your Care Instructions    An abscessed tooth is a tooth that has a pocket of pus in the tissues around it. Pus forms when the body tries to fight an infection caused by bacteria. If the pus cannot drain, it forms an abscess. An abscessed tooth can cause red, swollen gums and throbbing pain, especially when you chew. You may have a bad taste in your mouth and a fever, and your jaw may swell. Damage to the tooth, untreated tooth decay, or gum disease can cause an abscessed tooth. An abscessed tooth needs to be treated by a dental professional right away. If it is not treated, the infection could spread to other parts of your body. Your dentist will give you antibiotics to stop the infection. He or she may make a hole in the tooth or cut open (ramón) the abscess inside your mouth so that the infection can drain, which should relieve your pain. You may need to have a root canal treatment, which tries to save your tooth by taking out the infected pulp and replacing it with a healing medicine and/or a filling. If these treatments do not work, your tooth may have to be removed. Follow-up care is a key part of your treatment and safety. Be sure to make and go to all appointments, and call your doctor if you are having problems. It's also a good idea to know your test results and keep a list of the medicines you take. How can you care for yourself at home? · Reduce pain and swelling in your face and jaw by putting ice or a cold pack on the outside of your cheek for 10 to 20 minutes at a time. Put a thin cloth between the ice and your skin. · Take pain medicines exactly as directed. ¨ If the doctor gave you a prescription medicine for pain, take it as prescribed. ¨ If you are not taking a prescription pain medicine, ask your doctor if you can take an over-the-counter medicine. · Take your antibiotics as directed.  Do not stop taking them just because you feel better. You need to take the full course of antibiotics. To prevent tooth abscess  · Brush and floss every day, and have regular dental checkups. · Eat a healthy diet, and avoid sugary foods and drinks. · Do not smoke or use spit tobacco. Tobacco use slows your ability to heal. It also increases your risk for gum disease and cancer of the mouth and throat. If you need help quitting, talk to your doctor about stop-smoking programs and medicines. These can increase your chances of quitting for good. When should you call for help? Call 911 anytime you think you may need emergency care. For example, call if:  · You have trouble breathing. Call your doctor now or seek immediate medical care if:  · You are dizzy or lightheaded, or you feel like you may faint. · You have a new or higher fever. · You have swelling, redness, or pain that spreads or gets worse. · You have pus coming from the tooth area. · You have an earache or pain behind your ear. · You have a fever with a stiff neck or a severe headache. · You are sensitive to light or feel very sleepy or confused. Watch closely for changes in your health, and be sure to contact your doctor if:  · You do not get better as expected. Where can you learn more? Go to http://gretta-barrie.info/. Enter O010 in the search box to learn more about \"Abscessed Tooth: Care Instructions. \"  Current as of: August 9, 2016  Content Version: 11.1  © 3482-5449 [employer]. Care instructions adapted under license by [employer] (which disclaims liability or warranty for this information). If you have questions about a medical condition or this instruction, always ask your healthcare professional. [place] [address] any warranty or liability for your use of this information.

## 2017-03-01 NOTE — PROGRESS NOTES
Chief Complaint   Patient presents with    Dental Pain     1. Have you been to the ER, urgent care clinic since your last visit? Hospitalized since your last visit? No    2. Have you seen or consulted any other health care providers outside of the [address] [place] [address] since your last visit? Include any pap smears or colon screening.  No

## 2017-03-01 NOTE — PROGRESS NOTES
Subjective:      Kaleigh Zhou is a 27 y.o. male who presents today for teeth decay. Teeth decay: pain is rated 8/10, he states he has had 2 days of pain. He states there is swelling and throbbing. He is taking 4 ibuprofen and it hasn't helped the pain. He states the location is lower jaw \"under tongue\". He has tried using homeopathic therapies including clove oil, this has also not helped. He denies fevers but has a headache. Patient Active Problem List    Diagnosis Date Noted    History of hepatitis C 02/03/2017    Arthritis 01/06/2017    PTSD (post-traumatic stress disorder) 10/25/2016    Generalized anxiety disorder 10/25/2016     Current Outpatient Prescriptions   Medication Sig Dispense Refill    diazePAM (VALIUM) 10 mg tablet Take a 0.5 tab as needed for anxiety two times a day. You may take an additional 0.5 tab as needed for days when the anxiety is not well controlled. 45 Tab 0    ketoconazole (NIZORAL) 2 % topical cream Apply  to affected area daily. 15 g 0     No Known Allergies  History reviewed. No pertinent past medical history. Family History   Problem Relation Age of Onset    Hypertension Mother     Diabetes Mother     Hypertension Father      Social History   Substance Use Topics    Smoking status: Never Smoker    Smokeless tobacco: Never Used    Alcohol use No        Review of Systems    A comprehensive review of systems was negative except for that written in the HPI. Objective:     Visit Vitals    /71 (BP 1 Location: Left arm, BP Patient Position: Sitting)    Pulse 91    Temp 98.2 °F (36.8 °C) (Oral)    Resp 18    Ht 5' 10\" (1.778 m)    Wt 130 lb (59 kg)    BMI 18.65 kg/m2     General appearance: alert, cooperative, appears in pain, appears stated age  Throat: abscess clearly visible on bottom row front teeth, interior gums affected.  Active pus drainage  Lungs: clear to auscultation bilaterally  Heart: regular rate and rhythm, S1, S2 normal, no murmur, click, rub or gallop  Neurologic: Grossly normal  Nursing note and vitals reviewed  Assessment/Plan:       ICD-10-CM ICD-9-CM    1. Dental abscess K04.7 522.5 amoxicillin-clavulanate (AUGMENTIN) 875-125 mg per tablet   2. Pain, dental K08.89 525.9 oxyCODONE-acetaminophen (PERCOCET) 5-325 mg per tablet   3. Skin lesion L98.9 709.9      Patient was given dental resources to contact asap. Small skin lesions on bilateral arms (2 total) seems improved since last visit but patient disagrees. He will continue antifungal 2 more weeks and will call if no improvement. Advised him to call back or return to office if symptoms worsen/change/persist.  Discussed expected course/resolution/complications of diagnosis in detail with patient. Medication risks/benefits/costs/interactions/alternatives discussed with patient. He was given an after visit summary which includes diagnoses, current medications, & vitals. He expressed understanding with the diagnosis and plan.

## 2017-03-13 ENCOUNTER — TELEPHONE (OUTPATIENT)
Dept: INTERNAL MEDICINE CLINIC | Facility: CLINIC | Age: 32
End: 2017-03-13

## 2017-03-13 DIAGNOSIS — F41.1 GENERALIZED ANXIETY DISORDER: ICD-10-CM

## 2017-03-13 RX ORDER — DIAZEPAM 10 MG/1
TABLET ORAL
Qty: 45 TAB | Refills: 0 | Status: SHIPPED | OUTPATIENT
Start: 2017-03-13 | End: 2017-04-10 | Stop reason: SDUPTHER

## 2017-05-02 ENCOUNTER — OFFICE VISIT (OUTPATIENT)
Dept: INTERNAL MEDICINE CLINIC | Facility: CLINIC | Age: 32
End: 2017-05-02

## 2017-05-02 VITALS
SYSTOLIC BLOOD PRESSURE: 117 MMHG | TEMPERATURE: 98.7 F | WEIGHT: 129 LBS | HEART RATE: 89 BPM | DIASTOLIC BLOOD PRESSURE: 79 MMHG | BODY MASS INDEX: 18.47 KG/M2 | HEIGHT: 70 IN | RESPIRATION RATE: 18 BRPM

## 2017-05-02 DIAGNOSIS — G44.201 ACUTE INTRACTABLE TENSION-TYPE HEADACHE: Primary | ICD-10-CM

## 2017-05-02 DIAGNOSIS — F43.10 PTSD (POST-TRAUMATIC STRESS DISORDER): ICD-10-CM

## 2017-05-02 DIAGNOSIS — L23.7 POISON IVY DERMATITIS: ICD-10-CM

## 2017-05-02 RX ORDER — CYCLOBENZAPRINE HCL 5 MG
5 TABLET ORAL
Qty: 20 TAB | Refills: 0 | Status: SHIPPED | OUTPATIENT
Start: 2017-05-02 | End: 2017-06-08

## 2017-05-02 RX ORDER — TRIAMCINOLONE ACETONIDE 1 MG/G
OINTMENT TOPICAL 2 TIMES DAILY
Qty: 30 G | Refills: 0 | Status: SHIPPED | OUTPATIENT
Start: 2017-05-02 | End: 2017-06-08

## 2017-05-02 RX ORDER — SUMATRIPTAN 50 MG/1
50 TABLET, FILM COATED ORAL
Qty: 20 TAB | Refills: 0 | Status: SHIPPED | OUTPATIENT
Start: 2017-05-02 | End: 2017-06-08

## 2017-05-02 NOTE — PROGRESS NOTES
Chief Complaint   Patient presents with    Head Pain     been having a constant headache for the last 4 days even when he awakens in the middle of the night. 1. Have you been to the ER, urgent care clinic since your last visit? Hospitalized since your last visit? No    2. Have you seen or consulted any other health care providers outside of the [address] [place] [address] since your last visit? Include any pap smears or colon screening.  No

## 2017-05-02 NOTE — PROGRESS NOTES
Subjective:      Damián Santos is a 27 y.o. male who presents today for headaches. Neurological Review  He is here today to talk about headaches. This is a new problem, he has a history of migraines but states this one is as severe as those. This episode started 4 days ago, he states that photosensitivity started today. He states the pain is to the front of the head, back of head, and trapezius, he believes it may be a tension headache. The pain is described as sharp pain, he states with heat it becomes dull and achy. Pain rated 7/10. Associated symptoms include: nausea, photosensitivity. Precipitating factors are: stress. He denies a history of recent head injury. Treatments for headaches currently include: ibuprofen 800mg, he states it helped it a little but it has not taken it away. Poison ivy: he has itching rash to bilateral arms that started 6-7 days ago after pulling a vine from the yard. He denies spreading, pain. PTSD: he was seeing a therapist but was not able to schedule a follow up apt so requests to see someone else. Patient Active Problem List    Diagnosis Date Noted    History of hepatitis C 02/03/2017    Arthritis 01/06/2017    PTSD (post-traumatic stress disorder) 10/25/2016    Generalized anxiety disorder 10/25/2016     Current Outpatient Prescriptions   Medication Sig Dispense Refill    diazePAM (VALIUM) 10 mg tablet Take a 0.5 tab as needed for anxiety two times a day. You may take an additional 0.5 tab as needed for days when the anxiety is not well controlled. 45 Tab 0    ketoconazole (NIZORAL) 2 % topical cream Apply  to affected area daily. 15 g 0     No Known Allergies  History reviewed. No pertinent past medical history.   Family History   Problem Relation Age of Onset    Hypertension Mother     Diabetes Mother     Hypertension Father      Social History   Substance Use Topics    Smoking status: Never Smoker    Smokeless tobacco: Never Used    Alcohol use No        Review of Systems    A comprehensive review of systems was negative except for that written in the HPI. Objective:     Visit Vitals    /79 (BP 1 Location: Right arm, BP Patient Position: Sitting)    Pulse 89    Temp 98.7 °F (37.1 °C) (Oral)    Resp 18    Ht 5' 10\" (1.778 m)    Wt 129 lb (58.5 kg)    BMI 18.51 kg/m2     General appearance: alert, cooperative, no distress, appears stated age  Head: Normocephalic, without obvious abnormality, atraumatic  Eyes: conjunctivae/corneas clear. PERRL, EOM's intact. Fundi benign  Ears: normal TM's and external ear canals AU  Nose: Nares normal. Septum midline. Mucosa normal. No drainage or sinus tenderness. Throat: Lips, mucosa, and tongue normal. Teeth and gums normal  Neck: supple, symmetrical, trachea midline, no adenopathy and tight trapezius  Lungs: clear to auscultation bilaterally  Heart: regular rate and rhythm, S1, S2 normal, no murmur, click, rub or gallop  Skin: maclopapular lesions to right forearms, some vesicular  Neurologic: Alert and oriented X 3, normal strength and tone. Normal coordination and gait  Psych: appropriate mood, speech, affect  Nursing note and vitals reviewed  Assessment/Plan:       ICD-10-CM ICD-9-CM    1. Acute intractable tension-type headache G44.201 339.10 SUMAtriptan (IMITREX) 50 mg tablet      cyclobenzaprine (FLEXERIL) 5 mg tablet   2. PTSD (post-traumatic stress disorder) F43.10 309.81 REFERRAL TO BEHAVIORAL HEALTH   3. Poison ivy dermatitis L23.7 692.6 triamcinolone acetonide (KENALOG) 0.1 % ointment     Follow-up Disposition:  Return if symptoms worsen or fail to improve. Advised him to call back or return to office if symptoms worsen/change/persist.  Discussed expected course/resolution/complications of diagnosis in detail with patient. Medication risks/benefits/costs/interactions/alternatives discussed with patient.   He was given an after visit summary which includes diagnoses, current medications, & vitals. He expressed understanding with the diagnosis and plan.

## 2017-05-02 NOTE — PATIENT INSTRUCTIONS
Tension Headache: Care Instructions  Your Care Instructions  Most headaches are tension headaches. These headaches tend to happen again, especially if you are under stress. A tension headache may cause pain or a feeling of pressure all over your head. You probably can't pinpoint the center of the pain. If you keep getting tension headaches, the best thing you can do to limit them is to find out what is causing them and then make changes in those areas. Follow-up care is a key part of your treatment and safety. Be sure to make and go to all appointments, and call your doctor if you are having problems. Its also a good idea to know your test results and keep a list of the medicines you take. How can you care for yourself at home? · Rest in a quiet, dark room with a cool cloth on your forehead until your headache is gone. Close your eyes, and try to relax or go to sleep. Don't watch TV or read. Avoid using the computer. · Use a warm, moist towel or a heating pad set on low to relax tight shoulder and neck muscles. · Have someone gently massage your neck and shoulders. · Take pain medicines exactly as directed. ¨ If the doctor gave you a prescription medicine for pain, take it as prescribed. ¨ If you are not taking a prescription pain medicine, ask your doctor if you can take an over-the-counter medicine. · Be careful not to take pain medicine more often than the instructions allow, because you may get worse or more frequent headaches when the medicine wears off. · If you get another tension headache, stop what you are doing and sit quietly for a moment. Close your eyes and breathe slowly. Try to relax your head and neck muscles. · Do not ignore new symptoms that occur with a headache, such as fever, weakness or numbness, vision changes, or confusion. These may be signs of a more serious problem. To help prevent headaches  · Keep a headache diary so you can figure out what triggers your headaches. Avoiding triggers may help you prevent headaches. Record when each headache began, how long it lasted, and what the pain was like (throbbing, aching, stabbing, or dull). List anything that may have triggered the headache, such as being physically or emotionally stressed or being anxious or depressed. Other possible triggers are hunger, anger, fatigue, poor posture, and muscle strain. · Find healthy ways to deal with stress. Headaches are most common during or right after stressful times. Take time to relax before and after you do something that has caused a headache in the past.  · Exercise daily to relieve stress. Relaxation exercises may help reduce tension. · Get plenty of sleep. · Eat regularly and well. Long periods without food can trigger a headache. · Treat yourself to a massage. Some people find that massages are very helpful in relieving tension. · Try to keep your muscles relaxed by keeping good posture. Check your jaw, face, neck, and shoulder muscles for tension, and try to relax them. When sitting at a desk, change positions often, and stretch for 30 seconds each hour. · Reduce eyestrain from computers by blinking frequently and looking away from the computer screen every so often. Make sure you have proper eyewear and that your monitor is set up properly, about an arms length away. When should you call for help? Call 911 anytime you think you may need emergency care. For example, call if:  · You have signs of a stroke. These may include:  ¨ Sudden numbness, paralysis, or weakness in your face, arm, or leg, especially on only one side of your body. ¨ Sudden vision changes. ¨ Sudden trouble speaking. ¨ Sudden confusion or trouble understanding simple statements. ¨ Sudden problems with walking or balance. ¨ A sudden, severe headache that is different from past headaches. Call your doctor now or seek immediate medical care if:  · You have new or worse nausea and vomiting.   · You have a new or higher fever. · Your headache gets much worse. Watch closely for changes in your health, and be sure to contact your doctor if:  · You are not getting better after 2 days (48 hours). Where can you learn more? Go to http://gretta-barrie.info/. Enter 84 17 80 in the search box to learn more about \"Tension Headache: Care Instructions. \"  Current as of: October 14, 2016  Content Version: 11.2  © 0243-3073 [employer]. Care instructions adapted under license by [employer] (which disclaims liability or warranty for this information). If you have questions about a medical condition or this instruction, always ask your healthcare professional. [address] any warranty or liability for your use of this information.

## 2017-05-02 NOTE — MR AVS SNAPSHOT
Visit Information Date & Time Provider Department Dept. Phone Encounter #  
 5/2/2017  3:30 PM Radha Otto [address] Internal Medicine [phone] [id number] Follow-up Instructions Return if symptoms worsen or fail to improve. Upcoming Health Maintenance Date Due INFLUENZA AGE 9 TO ADULT 8/1/2017 DTaP/Tdap/Td series (2 - Td) 2/3/2027 Allergies as of 5/2/2017  Review Complete On: 5/2/2017 By: Luis Carlos Patel NP No Known Allergies Current Immunizations  Reviewed on 2/3/2017 Name Date Tdap 2/3/2017 Not reviewed this visit You Were Diagnosed With   
  
 Codes Comments Acute intractable tension-type headache    -  Primary ICD-10-CM: T64.536 ICD-9-CM: 339.10 PTSD (post-traumatic stress disorder)     ICD-10-CM: F43.10 ICD-9-CM: 309.81 Poison ivy dermatitis     ICD-10-CM: L23.7 ICD-9-CM: 692.6 Vitals BP Pulse Temp Resp Height(growth percentile) Weight(growth percentile) 117/79 (BP 1 Location: Right arm, BP Patient Position: Sitting) 89 98.7 °F (37.1 °C) (Oral) 18 5' 10\" (1.778 m) 129 lb (58.5 kg) BMI Smoking Status 18.51 kg/m2 Never Smoker BMI and BSA Data Body Mass Index Body Surface Area 18.51 kg/m 2 1.7 m 2 Preferred Pharmacy Pharmacy Name Phone [facility]/PH[facility]ACY #[id number] [address] [phone] Your Updated Medication List  
  
   
This list is accurate as of: 5/2/17  3:56 PM.  Always use your most recent med list.  
  
  
  
  
 cyclobenzaprine 5 mg tablet Commonly known as:  FLEXERIL Take 1 Tab by mouth nightly. diazePAM 10 mg tablet Commonly known as:  VALIUM Take a 0.5 tab as needed for anxiety two times a day. You may take an additional 0.5 tab as needed for days when the anxiety is not well controlled.   
  
 ketoconazole 2 % topical cream  
Commonly known as:  NIZORAL  
 Apply  to affected area daily. SUMAtriptan 50 mg tablet Commonly known as:  IMITREX Take 1 Tab by mouth once as needed for Migraine for up to 1 dose. triamcinolone acetonide 0.1 % ointment Commonly known as:  KENALOG Apply  to affected area two (2) times a day. Prescriptions Sent to Pharmacy Refills SUMAtriptan (IMITREX) 50 mg tablet 0 Sig: Take 1 Tab by mouth once as needed for Migraine for up to 1 dose. Class: Normal  
 Pharmacy: [facility]/pharmacy [address] [place] [address] [address] Ph #: [phone] Route: Oral  
 cyclobenzaprine (FLEXERIL) 5 mg tablet 0 Sig: Take 1 Tab by mouth nightly. Class: Normal  
 [facility] [address] [place] [address] [address] Ph #: [phone] Route: Oral  
 triamcinolone acetonide (KENALOG) 0.1 % ointment 0 Sig: Apply  to affected area two (2) times a day. Class: Normal  
 [facility] [address] [place] [address] [address] Ph #: [phone] Route: Topical  
  
We Performed the Following REFERRAL TO BEHAVIORAL HEALTH [REF8 Custom] Follow-up Instructions Return if symptoms worsen or fail to improve. Referral Information Referral ID Referred By Referred To  
  
 [id number] SKIFF, [address], [address], [address] [facility] Phone: [phone] Visits Status Start Date End Date 1 New Request 5/2/17 5/2/18 If your referral has a status of pending review or denied, additional information will be sent to support the outcome of this decision. Patient Instructions Tension Headache: Care Instructions Your Care Instructions Most headaches are tension headaches. These headaches tend to happen again, especially if you are under stress.  A tension headache may cause pain or a feeling of pressure all over your head. You probably can't pinpoint the center of the pain. If you keep getting tension headaches, the best thing you can do to limit them is to find out what is causing them and then make changes in those areas. Follow-up care is a key part of your treatment and safety. Be sure to make and go to all appointments, and call your doctor if you are having problems. Its also a good idea to know your test results and keep a list of the medicines you take. How can you care for yourself at home? · Rest in a quiet, dark room with a cool cloth on your forehead until your headache is gone. Close your eyes, and try to relax or go to sleep. Don't watch TV or read. Avoid using the computer. · Use a warm, moist towel or a heating pad set on low to relax tight shoulder and neck muscles. · Have someone gently massage your neck and shoulders. · Take pain medicines exactly as directed. ¨ If the doctor gave you a prescription medicine for pain, take it as prescribed. ¨ If you are not taking a prescription pain medicine, ask your doctor if you can take an over-the-counter medicine. · Be careful not to take pain medicine more often than the instructions allow, because you may get worse or more frequent headaches when the medicine wears off. · If you get another tension headache, stop what you are doing and sit quietly for a moment. Close your eyes and breathe slowly. Try to relax your head and neck muscles. · Do not ignore new symptoms that occur with a headache, such as fever, weakness or numbness, vision changes, or confusion. These may be signs of a more serious problem. To help prevent headaches · Keep a headache diary so you can figure out what triggers your headaches. Avoiding triggers may help you prevent headaches. Record when each headache began, how long it lasted, and what the pain was like (throbbing, aching, stabbing, or dull).  List anything that may have triggered the headache, such as being physically or emotionally stressed or being anxious or depressed. Other possible triggers are hunger, anger, fatigue, poor posture, and muscle strain. · Find healthy ways to deal with stress. Headaches are most common during or right after stressful times. Take time to relax before and after you do something that has caused a headache in the past. 
· Exercise daily to relieve stress. Relaxation exercises may help reduce tension. · Get plenty of sleep. · Eat regularly and well. Long periods without food can trigger a headache. · Treat yourself to a massage. Some people find that massages are very helpful in relieving tension. · Try to keep your muscles relaxed by keeping good posture. Check your jaw, face, neck, and shoulder muscles for tension, and try to relax them. When sitting at a desk, change positions often, and stretch for 30 seconds each hour. · Reduce eyestrain from computers by blinking frequently and looking away from the computer screen every so often. Make sure you have proper eyewear and that your monitor is set up properly, about an arms length away. When should you call for help? Call 911 anytime you think you may need emergency care. For example, call if: 
· You have signs of a stroke. These may include: 
¨ Sudden numbness, paralysis, or weakness in your face, arm, or leg, especially on only one side of your body. ¨ Sudden vision changes. ¨ Sudden trouble speaking. ¨ Sudden confusion or trouble understanding simple statements. ¨ Sudden problems with walking or balance. ¨ A sudden, severe headache that is different from past headaches. Call your doctor now or seek immediate medical care if: 
· You have new or worse nausea and vomiting. · You have a new or higher fever. · Your headache gets much worse. Watch closely for changes in your health, and be sure to contact your doctor if: 
· You are not getting better after 2 days (48 hours). Where can you learn more? Go to http://grteta-barrie.info/. Enter 84 17 85 in the search box to learn more about \"Tension Headache: Care Instructions. \" Current as of: October 14, 2016 Content Version: 11.2 © 2106-7655 [employer]. Care instructions adapted under license by [employer] (which disclaims liability or warranty for this information). If you have questions about a medical condition or this instruction, always ask your healthcare professional. [address] any warranty or liability for your use of this information. Introducing [facility] & HEALTH SERVICES! Dean Ibanez introduces AwayFind patient portal. Now you can access parts of your medical record, email your doctor's office, and request medication refills online. 1. In your internet browser, go to [email or web address] [email or web address]/The [email or web address] Post 2. Click on the First Time User? Click Here link in the Sign In box. You will see the New Member Sign Up page. 3. Enter your AwayFind Access Code exactly as it appears below. You will not need to use this code after youve completed the sign-up process. If you do not sign up before the expiration date, you must request a new code. · AwayFind Access Code: [email or web address] Expires: 7/31/2017  3:25 PM 
 
4. Enter the last four digits of your Social Security Number (xxxx) and Date of Birth (mm/dd/yyyy) as indicated and click Submit. You will be taken to the next sign-up page. 5. Create a Airborne Mobilet ID. This will be your AwayFind login ID and cannot be changed, so think of one that is secure and easy to remember. 6. Create a AwayFind password. You can change your password at any time. 7. Enter your Password Reset Question and Answer. This can be used at a later time if you forget your password. 8. Enter your e-mail address. You will receive e-mail notification when new information is available in [address] 9. Click Sign Up. You can now view and download portions of your medical record. 10. Click the Download Summary menu link to download a portable copy of your medical information. If you have questions, please visit the Frequently Asked Questions section of the [employer] website. Remember, [employer] is NOT to be used for urgent needs. For medical emergencies, dial 911. Now available from your iPhone and Android! Please provide this summary of care documentation to your next provider. Your primary care clinician is listed as Sukhwinder Ochoa. If you have any questions after today's visit, please call [phone].

## 2017-05-05 ENCOUNTER — TELEPHONE (OUTPATIENT)
Dept: INTERNAL MEDICINE CLINIC | Facility: CLINIC | Age: 32
End: 2017-05-05

## 2017-05-05 DIAGNOSIS — G44.221 CHRONIC TENSION-TYPE HEADACHE, INTRACTABLE: Primary | ICD-10-CM

## 2017-05-05 NOTE — TELEPHONE ENCOUNTER
----- Message from Stephany Wright sent at 5/4/2017  4:08 PM EDT -----  Regarding: NP Skiff/ telephone   Pt is requesting a call back to discuss serve headaches. Best contact number [phone].

## 2017-05-05 NOTE — TELEPHONE ENCOUNTER
I called pt to advise him to schedule an appt to discuss his concern. Pt stated that he has been having severe headaches for a week now. I asked him to schedule an appt but he was at an appt at the time. I advised him to call back after his appt. Pt stated that he will.  Alaine Severs, LPN

## 2017-05-19 DIAGNOSIS — F41.1 GENERALIZED ANXIETY DISORDER: ICD-10-CM

## 2017-05-19 RX ORDER — DIAZEPAM 10 MG/1
TABLET ORAL
Qty: 45 TAB | Refills: 0 | Status: CANCELLED | OUTPATIENT
Start: 2017-05-19

## 2017-05-19 RX ORDER — DIAZEPAM 10 MG/1
TABLET ORAL
Qty: 45 TAB | Refills: 0 | Status: SHIPPED | OUTPATIENT
Start: 2017-05-19 | End: 2017-06-16 | Stop reason: SDUPTHER

## 2017-06-08 ENCOUNTER — OFFICE VISIT (OUTPATIENT)
Dept: INTERNAL MEDICINE CLINIC | Facility: CLINIC | Age: 32
End: 2017-06-08

## 2017-06-08 VITALS
TEMPERATURE: 98.4 F | SYSTOLIC BLOOD PRESSURE: 107 MMHG | WEIGHT: 133 LBS | DIASTOLIC BLOOD PRESSURE: 87 MMHG | BODY MASS INDEX: 19.04 KG/M2 | HEART RATE: 85 BPM | RESPIRATION RATE: 18 BRPM | HEIGHT: 70 IN

## 2017-06-08 DIAGNOSIS — B37.0 THRUSH: Primary | ICD-10-CM

## 2017-06-08 RX ORDER — FLUCONAZOLE 150 MG/1
150 TABLET ORAL
Qty: 2 TAB | Refills: 0 | Status: SHIPPED | OUTPATIENT
Start: 2017-06-08 | End: 2019-03-19 | Stop reason: SDUPTHER

## 2017-06-08 RX ORDER — CLOTRIMAZOLE 10 MG/1
10 LOZENGE ORAL; TOPICAL
Qty: 50 TAB | Refills: 0 | Status: SHIPPED | OUTPATIENT
Start: 2017-06-08 | End: 2017-06-18

## 2017-06-08 NOTE — PROGRESS NOTES
HISTORY OF PRESENT ILLNESS  Tahir Echavarria is a 27 y.o. male. Chief Complaint   Patient presents with   Eric Viveros     believes he may have oral thrush, itcihing, white patches. HPI  1) ? Allergies vs. Lissa Yi. Seasonal allergies have been present lately, but though nasal congestion has remained constant, roof of mouth continues to become more uncomfortable. \"Itchy\" roof of mouth - sometimes sore. Worse in afternoon. No change in diet - varied diet. Not high in spicy/citrus foods. No recent antibiotics. Very stressed recently with hx PTSD. Needs to find a counselor. Has the resources, but has not yet scheduled it. Review of Systems   Constitutional: Positive for malaise/fatigue. Negative for fever. HENT: Positive for congestion and ear pain. Negative for sore throat. Eyes: Positive for discharge. Respiratory: Negative for cough, sputum production and shortness of breath. Skin: Negative for rash. Neurological: Positive for headaches. Endo/Heme/Allergies: Positive for environmental allergies. Physical Exam   Constitutional: He is oriented to person, place, and time. He appears well-developed and well-nourished. No distress. HENT:   Head: Normocephalic and atraumatic. Mouth/Throat: Oropharynx is clear and moist.   Bilateral TMs with fluid. No erythema. Nasal mucosa pale, inflamed. Tongue with pale yellow/white coating. Roof of mouth with some obvious excoriation as well as thin layer of pale yellow/white thrush. Eyes: Conjunctivae are normal.   Neck: Neck supple. Cardiovascular: Normal rate, regular rhythm and normal heart sounds. Pulmonary/Chest: Effort normal and breath sounds normal.   Lymphadenopathy:     He has no cervical adenopathy. Neurological: He is alert and oriented to person, place, and time. Skin: Skin is warm and dry. Psychiatric:   Anxious appearing. Nursing note and vitals reviewed. ASSESSMENT and PLAN    ICD-10-CM ICD-9-CM    1.  Lissabetty Yi B37.0 112.0 fluconazole (DIFLUCAN) 150 mg tablet      clotrimazole (MYCELEX) 10 mg romeo

## 2017-06-08 NOTE — PROGRESS NOTES
Chief Complaint   Patient presents with   Dennys Edgar     believes he may have oral thrush, itcihing, white patches. 1. Have you been to the ER, urgent care clinic since your last visit? Hospitalized since your last visit? No    2. Have you seen or consulted any other health care providers outside of the [facility] since your last visit? Include any pap smears or colon screening.  No

## 2017-06-08 NOTE — MR AVS SNAPSHOT
Visit Information Date & Time Provider Department Dept. Phone Encounter #  
 6/8/2017  9:15 AM Bebeto Vargas, [address] Internal Medicine [phone] [id number] Follow-up Instructions Return if symptoms worsen or fail to improve. Upcoming Health Maintenance Date Due INFLUENZA AGE 9 TO ADULT 8/1/2017 DTaP/Tdap/Td series (2 - Td) 2/3/2027 Allergies as of 6/8/2017  Review Complete On: 6/8/2017 By: Umberto Chan LPN No Known Allergies Current Immunizations  Reviewed on 2/3/2017 Name Date Tdap 2/3/2017 Not reviewed this visit You Were Diagnosed With   
  
 Codes Comments Thrush    -  Primary ICD-10-CM: B37.0 ICD-9-CM: 112.0 Vitals BP Pulse Temp Resp Height(growth percentile) Weight(growth percentile) 107/87 (BP 1 Location: Left arm, BP Patient Position: Sitting) 85 98.4 °F (36.9 °C) (Oral) 18 5' 10\" (1.778 m) 133 lb (60.3 kg) BMI Smoking Status 19.08 kg/m2 Never Smoker Vitals History BMI and BSA Data Body Mass Index Body Surface Area 19.08 kg/m 2 1.73 m 2 Preferred Pharmacy Pharmacy Name Phone [facility]/PHARMACY #[id number], [place] - [address] [phone] Your Updated Medication List  
  
   
This list is accurate as of: 6/8/17 10:03 AM.  Always use your most recent med list.  
  
  
  
  
 clotrimazole 10 mg romeo Commonly known as:  Delmy Nunn Take 1 Tab by mouth five (5) times daily for 10 days. diazePAM 10 mg tablet Commonly known as:  VALIUM Take a 0.5 tab as needed for anxiety two times a day. You may take an additional 0.5 tab as needed for days when the anxiety is not well controlled. fluconazole 150 mg tablet Commonly known as:  DIFLUCAN Take 1 Tab by mouth every seven (7) days for 2 doses. Take one by mouth weekly.   
  
 ketoconazole 2 % topical cream  
Commonly known as:  NIZORAL  
 Apply  to affected area daily. Prescriptions Sent to Pharmacy Refills  
 fluconazole (DIFLUCAN) 150 mg tablet 0 Sig: Take 1 Tab by mouth every seven (7) days for 2 doses. Take one by mouth weekly. Class: Normal  
 Pharmacy: [facility]/pharmacy [address] [place] [address] [address] Ph #: [phone] Route: Oral  
 clotrimazole (MYCELEX) 10 mg romeo 0 Sig: Take 1 Tab by mouth five (5) times daily for 10 days. Class: Normal  
 Pharmacy: [facility]/pharmacy [address] [place] [address] [address] Ph #: [phone] Route: Oral  
  
Follow-up Instructions Return if symptoms worsen or fail to improve. Introducing [facility] & HEALTH SERVICES! [facility] introduces ScanDigital patient portal. Now you can access parts of your medical record, email your doctor's office, and request medication refills online. 1. In your internet browser, go to [email or web address] 2. Click on the First Time User? Click Here link in the Sign In box. You will see the New Member Sign Up page. 3. Enter your ScanDigital Access Code exactly as it appears below. You will not need to use this code after youve completed the sign-up process. If you do not sign up before the expiration date, you must request a new code. · [employer] Access Code: [email or web address] Expires: 7/31/2017  3:25 PM 
 
4. Enter the last four digits of your Social Security Number (xxxx) and Date of Birth (mm/dd/yyyy) as indicated and click Submit. You will be taken to the next sign-up page. 5. Create a goBaltot ID. This will be your ScanDigital login ID and cannot be changed, so think of one that is secure and easy to remember. 6. Create a ScanDigital password. You can change your password at any time. 7. Enter your Password Reset Question and Answer. This can be used at a later time if you forget your password. 8. Enter your e-mail address. You will receive e-mail notification when new information is available in [address] 9. Click Sign Up. You can now view and download portions of your medical record. 10. Click the Download Summary menu link to download a portable copy of your medical information. If you have questions, please visit the Frequently Asked Questions section of the [employer] website. Remember, [employer] is NOT to be used for urgent needs. For medical emergencies, dial 911. Now available from your iPhone and Android! Please provide this summary of care documentation to your next provider. Your primary care clinician is listed as Logan Sheets. If you have any questions after today's visit, please call [phone].

## 2017-06-16 DIAGNOSIS — F41.1 GENERALIZED ANXIETY DISORDER: ICD-10-CM

## 2017-06-16 RX ORDER — DIAZEPAM 10 MG/1
TABLET ORAL
Qty: 45 TAB | Refills: 0 | Status: SHIPPED | OUTPATIENT
Start: 2017-06-16 | End: 2017-07-18 | Stop reason: SDUPTHER

## 2017-06-19 ENCOUNTER — LAB ONLY (OUTPATIENT)
Dept: INTERNAL MEDICINE CLINIC | Facility: CLINIC | Age: 32
End: 2017-06-19

## 2017-06-19 ENCOUNTER — TELEPHONE (OUTPATIENT)
Dept: INTERNAL MEDICINE CLINIC | Facility: CLINIC | Age: 32
End: 2017-06-19

## 2017-06-19 NOTE — TELEPHONE ENCOUNTER
Patient is coming in to have labs drawn this morning that was ordered back in February. Please review orders.

## 2017-06-19 NOTE — PROGRESS NOTES
Patient in this morning fore labs that were ordered in February. Patient tolerated without any incidents. Will follow up with provider as needed.

## 2017-06-19 NOTE — TELEPHONE ENCOUNTER
Lab order previously placed (marked as collected but not resulted) in Feb - please draw labs per [facility] February order.

## 2017-06-23 LAB
ALBUMIN SERPL-MCNC: 5.2 G/DL (ref 3.5–5.5)
ALBUMIN/GLOB SERPL: 2.3 {RATIO} (ref 1.2–2.2)
ALP SERPL-CCNC: 56 IU/L (ref 39–117)
ALT SERPL-CCNC: 20 IU/L (ref 0–44)
ANA SER QL: NEGATIVE
AST SERPL-CCNC: 22 IU/L (ref 0–40)
BASOPHILS # BLD AUTO: 0 X10E3/UL (ref 0–0.2)
BASOPHILS NFR BLD AUTO: 0 %
BILIRUB SERPL-MCNC: 0.3 MG/DL (ref 0–1.2)
BUN SERPL-MCNC: 14 MG/DL (ref 6–20)
BUN/CREAT SERPL: 17 (ref 9–20)
CALCIUM SERPL-MCNC: 9.5 MG/DL (ref 8.7–10.2)
CHLORIDE SERPL-SCNC: 99 MMOL/L (ref 96–106)
CHOLEST SERPL-MCNC: 237 MG/DL (ref 100–199)
CO2 SERPL-SCNC: 22 MMOL/L (ref 18–29)
CREAT SERPL-MCNC: 0.84 MG/DL (ref 0.76–1.27)
EOSINOPHIL # BLD AUTO: 0.1 X10E3/UL (ref 0–0.4)
EOSINOPHIL NFR BLD AUTO: 3 %
ERYTHROCYTE [DISTWIDTH] IN BLOOD BY AUTOMATED COUNT: 12.6 % (ref 12.3–15.4)
GLOBULIN SER CALC-MCNC: 2.3 G/DL (ref 1.5–4.5)
GLUCOSE SERPL-MCNC: 83 MG/DL (ref 65–99)
HCT VFR BLD AUTO: 47.9 % (ref 37.5–51)
HCV AB S/CO SERPL IA: >11 S/CO RATIO (ref 0–0.9)
HCV RNA SERPL QL NAA+PROBE: POSITIVE
HDLC SERPL-MCNC: 52 MG/DL
HGB BLD-MCNC: 16 G/DL (ref 12.6–17.7)
IMM GRANULOCYTES # BLD: 0 X10E3/UL (ref 0–0.1)
IMM GRANULOCYTES NFR BLD: 0 %
LDLC SERPL CALC-MCNC: 129 MG/DL (ref 0–99)
LYMPHOCYTES # BLD AUTO: 1.2 X10E3/UL (ref 0.7–3.1)
LYMPHOCYTES NFR BLD AUTO: 27 %
MCH RBC QN AUTO: 30.9 PG (ref 26.6–33)
MCHC RBC AUTO-ENTMCNC: 33.4 G/DL (ref 31.5–35.7)
MCV RBC AUTO: 93 FL (ref 79–97)
MONOCYTES # BLD AUTO: 0.7 X10E3/UL (ref 0.1–0.9)
MONOCYTES NFR BLD AUTO: 15 %
NEUTROPHILS # BLD AUTO: 2.5 X10E3/UL (ref 1.4–7)
NEUTROPHILS NFR BLD AUTO: 55 %
PLATELET # BLD AUTO: 215 X10E3/UL (ref 150–379)
POTASSIUM SERPL-SCNC: 4.3 MMOL/L (ref 3.5–5.2)
PROT SERPL-MCNC: 7.5 G/DL (ref 6–8.5)
RBC # BLD AUTO: 5.18 X10E6/UL (ref 4.14–5.8)
RHEUMATOID FACT SERPL-ACNC: 13.8 IU/ML (ref 0–13.9)
SEE BELOW:, 164879: NORMAL
SODIUM SERPL-SCNC: 142 MMOL/L (ref 134–144)
TRIGL SERPL-MCNC: 278 MG/DL (ref 0–149)
VLDLC SERPL CALC-MCNC: 56 MG/DL (ref 5–40)
WBC # BLD AUTO: 4.5 X10E3/UL (ref 3.4–10.8)

## 2017-06-26 ENCOUNTER — TELEPHONE (OUTPATIENT)
Dept: INTERNAL MEDICINE CLINIC | Facility: CLINIC | Age: 32
End: 2017-06-26

## 2017-06-26 DIAGNOSIS — B18.2 CHRONIC HEPATITIS C WITHOUT HEPATIC COMA (HCC): Primary | ICD-10-CM

## 2017-06-26 NOTE — PROGRESS NOTES
GI referral placed, hyerplipidemia new diagnosis, patient to return for follow up appointment and discuss dietary interventions. Information sent via mail also.

## 2017-06-26 NOTE — TELEPHONE ENCOUNTER
Called to discuss labs. Hep C:He was on interferon for 2 months and told by a GI specialist in [place] that he did not need additional treatment. Will review whether GI referral is needed at this time and call the patient back. Hyperlipidemia: he has not had problems with this in the past. He states his only dietary change has been increasing coconut oil, he will scale back on this and read over the information sheets I am sending in the mail. He will return for repeat testing and follow up in 3 months.

## 2017-07-13 ENCOUNTER — TELEPHONE (OUTPATIENT)
Dept: INTERNAL MEDICINE CLINIC | Facility: CLINIC | Age: 32
End: 2017-07-13

## 2017-07-13 NOTE — TELEPHONE ENCOUNTER
Called and spoke with pt explained per NP [facility] she confirmed with GI that he needs to follow with Gi regarding his positive HEP C panel. Pt voiced an understanding and will follow up as needed.  Annalisa Martin LPN

## 2017-07-13 NOTE — TELEPHONE ENCOUNTER
----- Message from Felicity Ochoa NP sent at 7/13/2017 11:53 AM EDT -----  Please let patient know that I confirmed with GI and he needs to follow up with them.  Hep C panel is positive which needs further assessment

## 2017-07-18 DIAGNOSIS — F41.1 GENERALIZED ANXIETY DISORDER: ICD-10-CM

## 2017-07-18 RX ORDER — DIAZEPAM 10 MG/1
TABLET ORAL
Qty: 45 TAB | Refills: 0 | Status: SHIPPED | OUTPATIENT
Start: 2017-07-18 | End: 2017-08-21 | Stop reason: SDUPTHER

## 2017-08-21 DIAGNOSIS — F41.1 GENERALIZED ANXIETY DISORDER: ICD-10-CM

## 2017-08-21 RX ORDER — DIAZEPAM 10 MG/1
TABLET ORAL
Qty: 45 TAB | Refills: 0 | Status: SHIPPED | OUTPATIENT
Start: 2017-08-21 | End: 2017-09-22 | Stop reason: SDUPTHER

## 2017-09-22 DIAGNOSIS — F41.1 GENERALIZED ANXIETY DISORDER: ICD-10-CM

## 2017-09-22 RX ORDER — DIAZEPAM 10 MG/1
TABLET ORAL
Qty: 45 TAB | Refills: 0 | Status: SHIPPED | OUTPATIENT
Start: 2017-09-22 | End: 2017-10-23 | Stop reason: SDUPTHER

## 2017-10-23 DIAGNOSIS — F41.1 GENERALIZED ANXIETY DISORDER: ICD-10-CM

## 2017-10-24 RX ORDER — DIAZEPAM 10 MG/1
TABLET ORAL
Qty: 45 TAB | Refills: 0 | Status: SHIPPED | OUTPATIENT
Start: 2017-10-24 | End: 2017-11-27 | Stop reason: SDUPTHER

## 2017-11-20 ENCOUNTER — OFFICE VISIT (OUTPATIENT)
Dept: INTERNAL MEDICINE CLINIC | Facility: CLINIC | Age: 32
End: 2017-11-20

## 2017-11-20 VITALS
WEIGHT: 133.2 LBS | HEART RATE: 85 BPM | RESPIRATION RATE: 18 BRPM | SYSTOLIC BLOOD PRESSURE: 123 MMHG | HEIGHT: 70 IN | DIASTOLIC BLOOD PRESSURE: 76 MMHG | OXYGEN SATURATION: 97 % | BODY MASS INDEX: 19.07 KG/M2 | TEMPERATURE: 98.5 F

## 2017-11-20 DIAGNOSIS — J01.91 ACUTE RECURRENT SINUSITIS, UNSPECIFIED LOCATION: Primary | ICD-10-CM

## 2017-11-20 RX ORDER — LEVOFLOXACIN 500 MG/1
500 TABLET, FILM COATED ORAL DAILY
Qty: 10 TAB | Refills: 0 | Status: SHIPPED | OUTPATIENT
Start: 2017-11-20 | End: 2017-12-22

## 2017-11-20 RX ORDER — METHYLPREDNISOLONE 4 MG/1
TABLET ORAL
Qty: 1 DOSE PACK | Refills: 0 | Status: SHIPPED | OUTPATIENT
Start: 2017-11-20 | End: 2017-12-22 | Stop reason: SDUPTHER

## 2017-11-20 NOTE — MR AVS SNAPSHOT
Visit Information Date & Time Provider Department Dept. Phone Encounter #  
 11/20/2017 11:15 AM Noe Jackson PA-C [facility] Internal Medicine [phone] [id number] Upcoming Health Maintenance Date Due Pneumococcal 19-64 Medium Risk (1 of 1 - PPSV23) 7/29/2003 DTaP/Tdap/Td series (2 - Td) 2/3/2027 Allergies as of 11/20/2017  Review Complete On: 11/20/2017 By: Noe Jackson PA-C No Known Allergies Current Immunizations  Reviewed on 2/3/2017 Name Date Tdap 2/3/2017 Not reviewed this visit You Were Diagnosed With   
  
 Codes Comments Acute recurrent sinusitis, unspecified location    -  Primary ICD-10-CM: J01.91 
ICD-9-CM: 461.9 Vitals BP Pulse Temp Resp Height(growth percentile) Weight(growth percentile) 123/76 (BP 1 Location: Left arm, BP Patient Position: Sitting) 85 98.5 °F (36.9 °C) (Oral) 18 5' 10\" (1.778 m) 133 lb 3.2 oz (60.4 kg) SpO2 BMI Smoking Status 97% 19.11 kg/m2 Never Smoker Vitals History BMI and BSA Data Body Mass Index Body Surface Area  
 19.11 kg/m 2 1.73 m 2 Preferred Pharmacy Pharmacy Name Phone [id number] [address] [phone] Your Updated Medication List  
  
   
This list is accurate as of: 11/20/17 11:46 AM.  Always use your most recent med list.  
  
  
  
  
 diazePAM 10 mg tablet Commonly known as:  VALIUM Take a 0.5 tab as needed for anxiety two times a day. You may take an additional 0.5 tab as needed for days when the anxiety is not well controlled. ketoconazole 2 % topical cream  
Commonly known as:  NIZORAL Apply  to affected area daily. levoFLOXacin 500 mg tablet Commonly known as:  Nile Shivam Take 1 Tab by mouth daily. methylPREDNISolone 4 mg tablet Commonly known as:  Martinez Glee Take as directed Prescriptions Sent to Pharmacy Refills  
 levoFLOXacin (LEVAQUIN) 500 mg tablet 0 Sig: Take 1 Tab by mouth daily. Class: Normal  
 [facility] [address] [facility] [address] Ph #: [phone] Route: Oral  
 methylPREDNISolone (MEDROL DOSEPACK) 4 mg tablet 0 Sig: Take as directed Class: Normal  
 Pharmacy: [facility]/pharmacy [address] [facility] [address] Ph #: [phone] Introducing [facility] SERVICES! Olga Gutierrez introduces [employer] patient portal. Now you can access parts of your medical record, email your doctor's office, and request medication refills online. 1. In your internet browser, go to [email or web address] 2. Click on the First Time User? Click Here link in the Sign In box. You will see the New Member Sign Up page. 3. Enter your [employer] Access Code exactly as it appears below. You will not need to use this code after youve completed the sign-up process. If you do not sign up before the expiration date, you must request a new code. · [employer] Access Code: [email or web address] Expires: 2/18/2018 11:22 AM 
 
4. Enter the last four digits of your Social Security Number (xxxx) and Date of Birth (mm/dd/yyyy) as indicated and click Submit. You will be taken to the next sign-up page. 5. Create a [employer] Systems ID. This will be your [employer] login ID and cannot be changed, so think of one that is secure and easy to remember. 6. Create a [employer] password. You can change your password at any time. 7. Enter your Password Reset Question and Answer. This can be used at a later time if you forget your password. 8. Enter your e-mail address. You will receive e-mail notification when new information is available in [address] 9. Click Sign Up. You can now view and download portions of your medical record.  
10. Click the Download Summary menu link to download a portable copy of your medical information. If you have questions, please visit the Frequently Asked Questions section of the [employer] website. Remember, [employer] is NOT to be used for urgent needs. For medical emergencies, dial 911. Now available from your iPhone and Android! Please provide this summary of care documentation to your next provider. Your primary care clinician is listed as Humberto Cleveland. If you have any questions after today's visit, please call [phone].

## 2017-11-20 NOTE — PROGRESS NOTES
HISTORY OF PRESENT ILLNESS  Sherice Johnson is a 34 y.o. male. Chief Complaint   Patient presents with    Cough     Patient states his chest hurts from coughing and has sore throat. Room 7     HPI  1) Productive Cough x several weeks. No fever. Nasal congestion. Not taking Mucinex, but has done this in the past and it helped. Rx'd Amoxicillin for tooth infection - taking this, and tooth feels somewhat better. Review of Systems   Constitutional: Positive for malaise/fatigue. Negative for fever. HENT: Positive for congestion. Negative for ear pain. Respiratory: Positive for cough and sputum production. Negative for shortness of breath. Neurological: Positive for headaches. Endo/Heme/Allergies: Negative for environmental allergies. Physical Exam   Constitutional: He is oriented to person, place, and time. He appears well-developed and well-nourished. No distress. HENT:   Head: Normocephalic and atraumatic. Mouth/Throat: Oropharynx is clear and moist.   Bilateral TMs with fluid. No erythema. Nasal mucosa red, inflamed. Eyes: Conjunctivae are normal.   Neck: Neck supple. Cardiovascular: Normal rate, regular rhythm and normal heart sounds. Pulmonary/Chest: Effort normal and breath sounds normal.   Lymphadenopathy:     He has no cervical adenopathy. Neurological: He is alert and oriented to person, place, and time. Skin: Skin is warm and dry. Nursing note and vitals reviewed. ASSESSMENT and PLAN    ICD-10-CM ICD-9-CM    1.  Acute recurrent sinusitis, unspecified location J01.91 461.9 Change Amoxicillin to levoFLOXacin (LEVAQUIN) 500 mg tablet      methylPREDNISolone (MEDROL DOSEPACK) 4 mg tablet

## 2017-11-20 NOTE — PROGRESS NOTES
Chief Complaint   Patient presents with    Cough     Patient states his chest hurts from coughing and has sore throat. Room 7     1. Have you been to the ER, urgent care clinic since your last visit? Hospitalized since your last visit? No    2. Have you seen or consulted any other health care providers outside of the [address] [place] [address] since your last visit? Include any pap smears or colon screening.  No     Health Maintenance Due   Topic Date Due    Pneumococcal 19-64 Medium Risk (1 of 1 - PPSV23) 07/29/2003    Influenza Age 9 to Adult  08/01/2017

## 2017-11-27 DIAGNOSIS — F41.1 GENERALIZED ANXIETY DISORDER: ICD-10-CM

## 2017-11-28 RX ORDER — DIAZEPAM 10 MG/1
TABLET ORAL
Qty: 45 TAB | Refills: 0 | Status: SHIPPED | OUTPATIENT
Start: 2017-11-28 | End: 2017-12-22 | Stop reason: SDUPTHER

## 2017-12-22 ENCOUNTER — TELEPHONE (OUTPATIENT)
Dept: INTERNAL MEDICINE CLINIC | Facility: CLINIC | Age: 32
End: 2017-12-22

## 2017-12-22 ENCOUNTER — OFFICE VISIT (OUTPATIENT)
Dept: INTERNAL MEDICINE CLINIC | Facility: CLINIC | Age: 32
End: 2017-12-22

## 2017-12-22 VITALS
SYSTOLIC BLOOD PRESSURE: 124 MMHG | DIASTOLIC BLOOD PRESSURE: 77 MMHG | WEIGHT: 129 LBS | HEIGHT: 70 IN | HEART RATE: 78 BPM | RESPIRATION RATE: 18 BRPM | BODY MASS INDEX: 18.47 KG/M2 | TEMPERATURE: 97.9 F

## 2017-12-22 DIAGNOSIS — F41.1 GENERALIZED ANXIETY DISORDER: ICD-10-CM

## 2017-12-22 DIAGNOSIS — J30.9 ACUTE ALLERGIC RHINITIS, UNSPECIFIED SEASONALITY, UNSPECIFIED TRIGGER: Primary | ICD-10-CM

## 2017-12-22 RX ORDER — CETIRIZINE HCL 10 MG
10 TABLET ORAL
COMMUNITY
Start: 2017-12-22 | End: 2018-06-05 | Stop reason: SDUPTHER

## 2017-12-22 RX ORDER — METHYLPREDNISOLONE 4 MG/1
TABLET ORAL
Qty: 1 DOSE PACK | Refills: 0 | Status: SHIPPED | OUTPATIENT
Start: 2017-12-22 | End: 2018-03-27

## 2017-12-22 RX ORDER — DIAZEPAM 10 MG/1
TABLET ORAL
Qty: 45 TAB | Refills: 0 | Status: SHIPPED | OUTPATIENT
Start: 2017-12-22 | End: 2018-01-30 | Stop reason: SDUPTHER

## 2017-12-22 RX ORDER — FLUTICASONE PROPIONATE 50 MCG
2 SPRAY, SUSPENSION (ML) NASAL DAILY
Qty: 1 BOTTLE | Refills: 11 | Status: SHIPPED | OUTPATIENT
Start: 2017-12-22 | End: 2019-01-12 | Stop reason: SDUPTHER

## 2017-12-22 NOTE — MR AVS SNAPSHOT
Visit Information Date & Time Provider Department Dept. Phone Encounter #  
 12/22/2017  8:00 AM Debi Macias, [address] Internal Medicine [phone] [id number] Follow-up Instructions Return in about 3 months (around 3/22/2018) for Anxiety follow up. Your Appointments 1/2/2018  9:00 AM  
ROUTINE CARE with Debi Macias PA-C [facility] [employer] Internal Medicine ([address]) Appt Note: med fu  
 [address] Upcoming Health Maintenance Date Due Pneumococcal 19-64 Medium Risk (1 of 1 - PPSV23) 7/29/2003 DTaP/Tdap/Td series (2 - Td) 2/3/2027 Allergies as of 12/22/2017  Review Complete On: 12/22/2017 By: Pham Loredo LPN No Known Allergies Current Immunizations  Reviewed on 2/3/2017 Name Date Tdap 2/3/2017 Not reviewed this visit You Were Diagnosed With   
  
 Codes Comments Acute allergic rhinitis, unspecified seasonality, unspecified trigger    -  Primary ICD-10-CM: J30.9 ICD-9-CM: 477.9 Generalized anxiety disorder     ICD-10-CM: F41.1 ICD-9-CM: 300.02 Vitals BP Pulse Temp Resp Height(growth percentile) Weight(growth percentile) 124/77 78 97.9 °F (36.6 °C) (Oral) 18 5' 10\" (1.778 m) 129 lb (58.5 kg) BMI Smoking Status 18.51 kg/m2 Never Smoker Vitals History BMI and BSA Data Body Mass Index Body Surface Area 18.51 kg/m 2 1.7 m 2 Preferred Pharmacy Pharmacy Name Phone [facility]/PHARMACY #[id number], [place] - [address] [phone] Your Updated Medication List  
  
   
This list is accurate as of: 12/22/17  8:33 AM.  Always use your most recent med list.  
  
  
  
  
 cetirizine 10 mg tablet Commonly known as:  ZYRTEC Take 1 Tab by mouth nightly. diazePAM 10 mg tablet Commonly known as:  VALIUM Take a 0.5 tab as needed for anxiety two times a day. You may take an additional 0.5 tab as needed for days when the anxiety is not well controlled. fluticasone 50 mcg/actuation nasal spray Commonly known as:  Verona Rivera 2 Sprays by Both Nostrils route daily. ketoconazole 2 % topical cream  
Commonly known as:  NIZORAL Apply  to affected area daily. methylPREDNISolone 4 mg tablet Commonly known as:  Theresa Cole Take as directed Prescriptions Printed Refills  
 diazePAM (VALIUM) 10 mg tablet 0 Sig: Take a 0.5 tab as needed for anxiety two times a day. You may take an additional 0.5 tab as needed for days when the anxiety is not well controlled. Class: Print Prescriptions Sent to Pharmacy Refills  
 methylPREDNISolone (MEDROL DOSEPACK) 4 mg tablet 0 Sig: Take as directed Class: Normal  
 Pharmacy: [facility]/pharmacy [address] [place] [address] [address] Ph #: [phone]  
 fluticasone (FLONASE) 50 mcg/actuation nasal spray 11 Si Sprays by Both Nostrils route daily. Class: Normal  
 Pharmacy: [facility]/pharmacy [address] [place] [address] [address] Ph #: [phone] Route: Both Nostrils Follow-up Instructions Return in about 3 months (around 3/22/2018) for Anxiety follow up. Introducing [facility]! [facility] introduces Contorion patient portal. Now you can access parts of your medical record, email your doctor's office, and request medication refills online. 1. In your internet browser, go to [email or web address] 2. Click on the First Time User? Click Here link in the Sign In box. You will see the New Member Sign Up page. 3. Enter your Contorion Access Code exactly as it appears below. You will not need to use this code after youve completed the sign-up process.  If you do not sign up before the expiration date, you must request a new code. · Wikidata Access Code: [email or web address] Expires: 2/18/2018 11:22 AM 
 
4. Enter the last four digits of your Social Security Number (xxxx) and Date of Birth (mm/dd/yyyy) as indicated and click Submit. You will be taken to the next sign-up page. 5. Create a Wikidata ID. This will be your Wikidata login ID and cannot be changed, so think of one that is secure and easy to remember. 6. Create a Wikidata password. You can change your password at any time. 7. Enter your Password Reset Question and Answer. This can be used at a later time if you forget your password. 8. Enter your e-mail address. You will receive e-mail notification when new information is available in [address] 9. Click Sign Up. You can now view and download portions of your medical record. 10. Click the Download Summary menu link to download a portable copy of your medical information. If you have questions, please visit the Frequently Asked Questions section of the Wikidata website. Remember, Wikidata is NOT to be used for urgent needs. For medical emergencies, dial 911. Now available from your iPhone and Android! Please provide this summary of care documentation to your next provider. Your primary care clinician is listed as Khadijah Buckner. If you have any questions after today's visit, please call [phone].

## 2017-12-22 NOTE — TELEPHONE ENCOUNTER
Patient communicated that the pharmacy needs verbal authorization to have his Valium medication filled early. Patient will be traveling out of town for the holidays and won't return until the January 2nd. He's due for a fill on December 26th. Please authorize an early fill upon approval. Thanks!

## 2017-12-22 NOTE — PROGRESS NOTES
HISTORY OF PRESENT ILLNESS  Vickie Mcdowell is a 34 y.o. male. Chief Complaint   Patient presents with    Sinus Infection     Pt stated has not improved     HPI  1) Sick with a \"sinus infection\" x 2 months - took Amoxicillin (finished course) and Levaquin 500 mg. Nasal congestion and cough have not improved. Cough is productive, but not painful. No lymph enlargement or fever. 2) Anxiety - needs Valium refill. Using it sparingly. Feels that it is effective. Review of Systems   Constitutional: Positive for malaise/fatigue. Negative for fever. HENT: Positive for congestion. Negative for ear pain. Respiratory: Positive for cough and sputum production. Negative for shortness of breath. Neurological: Positive for headaches. Endo/Heme/Allergies: Negative for environmental allergies. Physical Exam   Constitutional: He is oriented to person, place, and time. He appears well-developed and well-nourished. No distress. HENT:   Head: Normocephalic and atraumatic. Mouth/Throat: Oropharynx is clear and moist.   Bilateral TMs with fluid. No erythema. Nasal mucosa pale, inflamed. Eyes: Conjunctivae are normal.   Neck: Neck supple. Cardiovascular: Normal rate, regular rhythm and normal heart sounds. Pulmonary/Chest: Effort normal and breath sounds normal.   Lymphadenopathy:     He has no cervical adenopathy. Neurological: He is alert and oriented to person, place, and time. Skin: Skin is warm and dry. Psychiatric: He has a normal mood and affect. His behavior is normal. Judgment and thought content normal.   Nursing note and vitals reviewed. ASSESSMENT and PLAN    ICD-10-CM ICD-9-CM    1. Acute allergic rhinitis, unspecified seasonality, unspecified trigger J30.9 477.9 methylPREDNISolone (MEDROL DOSEPACK) 4 mg tablet      cetirizine (ZYRTEC) 10 mg tablet      fluticasone (FLONASE) 50 mcg/actuation nasal spray   2.  Generalized anxiety disorder F41.1 300.02 diazePAM (VALIUM) 10 mg tablet

## 2017-12-22 NOTE — PROGRESS NOTES
Chief Complaint   Patient presents with    Sinus Infection     Pt stated has not improved     1. Have you been to the ER, urgent care clinic since your last visit? Hospitalized since your last visit? No    2. Have you seen or consulted any other health care providers outside of the [address] [place] [address] since your last visit? Include any pap smears or colon screening.  No

## 2018-01-30 DIAGNOSIS — F41.1 GENERALIZED ANXIETY DISORDER: ICD-10-CM

## 2018-01-30 RX ORDER — DIAZEPAM 10 MG/1
TABLET ORAL
Qty: 45 TAB | Refills: 0 | Status: SHIPPED | OUTPATIENT
Start: 2018-01-30 | End: 2018-02-28 | Stop reason: SDUPTHER

## 2018-02-28 DIAGNOSIS — F41.1 GENERALIZED ANXIETY DISORDER: ICD-10-CM

## 2018-02-28 RX ORDER — DIAZEPAM 10 MG/1
TABLET ORAL
Qty: 45 TAB | Refills: 0 | Status: SHIPPED | OUTPATIENT
Start: 2018-02-28 | End: 2018-03-27 | Stop reason: SDUPTHER

## 2018-03-27 ENCOUNTER — OFFICE VISIT (OUTPATIENT)
Dept: INTERNAL MEDICINE CLINIC | Facility: CLINIC | Age: 33
End: 2018-03-27

## 2018-03-27 VITALS
HEART RATE: 95 BPM | OXYGEN SATURATION: 98 % | DIASTOLIC BLOOD PRESSURE: 73 MMHG | TEMPERATURE: 97.8 F | BODY MASS INDEX: 19.1 KG/M2 | RESPIRATION RATE: 18 BRPM | HEIGHT: 70 IN | SYSTOLIC BLOOD PRESSURE: 114 MMHG | WEIGHT: 133.4 LBS

## 2018-03-27 DIAGNOSIS — F41.1 GENERALIZED ANXIETY DISORDER: ICD-10-CM

## 2018-03-27 RX ORDER — DIAZEPAM 10 MG/1
TABLET ORAL
Qty: 45 TAB | Refills: 2 | Status: SHIPPED | OUTPATIENT
Start: 2018-03-27 | End: 2018-07-09 | Stop reason: SDUPTHER

## 2018-03-27 NOTE — MR AVS SNAPSHOT
[address] [place] [address] 
[phone] Patient: Alf Licea MRN: [id number] :1985 Visit Information Date & Time Provider Department Dept. Phone Encounter #  
 3/27/2018  9:00 AM Magda Barnes, [address] [place] [address] Internal Medicine [phone] Follow-up Instructions Return in about 3 months (around 2018) for Anxiety follow up. Upcoming Health Maintenance Date Due Pneumococcal 19-64 Medium Risk (1 of 1 - PPSV23) 2003 DTaP/Tdap/Td series (2 - Td) 2/3/2027 Allergies as of 3/27/2018  Review Complete On: 3/27/2018 By: Nicci Mcgraw LPN No Known Allergies Current Immunizations  Reviewed on 2/3/2017 Name Date Tdap 2/3/2017 Not reviewed this visit You Were Diagnosed With   
  
 Codes Comments Generalized anxiety disorder     ICD-10-CM: F41.1 ICD-9-CM: 300.02 Vitals BP Pulse Temp Resp Height(growth percentile) Weight(growth percentile) 114/73 (BP 1 Location: Right arm, BP Patient Position: Sitting) 95 97.8 °F (36.6 °C) (Oral) 18 5' 10\" (1.778 m) 133 lb 6.4 oz (60.5 kg) SpO2 BMI Smoking Status 98% 19.14 kg/m2 Never Smoker Vitals History BMI and BSA Data Body Mass Index Body Surface Area  
 19.14 kg/m 2 1.73 m 2 Preferred Pharmacy Pharmacy Name Phone [facility]/PHARMACY #[id number] [address] [phone] Your Updated Medication List  
  
   
This list is accurate as of 3/27/18  9:47 AM.  Always use your most recent med list.  
  
  
  
  
 cetirizine 10 mg tablet Commonly known as:  ZYRTEC Take 1 Tab by mouth nightly. diazePAM 10 mg tablet Commonly known as:  VALIUM Take a 0.5 tab as needed for anxiety two times a day.  You may take an additional 0.5 tab as needed for days when the anxiety is not well controlled. Max: 45 tab per month. fluticasone 50 mcg/actuation nasal spray Commonly known as:  Celestia Orly 2 Sprays by Both Nostrils route daily. ketoconazole 2 % topical cream  
Commonly known as:  NIZORAL Apply  to affected area daily. Prescriptions Printed Refills  
 diazePAM (VALIUM) 10 mg tablet 2 Sig: Take a 0.5 tab as needed for anxiety two times a day. You may take an additional 0.5 tab as needed for days when the anxiety is not well controlled. Max: 45 tab per month. Class: Print We Performed the Following Caleb Ruth, CONFIRM [id number] CPT(R)] Follow-up Instructions Return in about 3 months (around 6/27/2018) for Anxiety follow up. Patient Instructions Consider Effexor (SNRI) for anxiety control. Introducing [facility]! Ronal Mckeon introduces Crowdpark patient portal. Now you can access parts of your medical record, email your doctor's office, and request medication refills online. 1. In your internet browser, go to [email or web address] 2. Click on the First Time User? Click Here link in the Sign In box. You will see the New Member Sign Up page. 3. Enter your Crowdpark Access Code exactly as it appears below. You will not need to use this code after youve completed the sign-up process. If you do not sign up before the expiration date, you must request a new code. · Crowdpark Access Code: [email or web address] Expires: 6/25/2018  9:00 AM 
 
4. Enter the last four digits of your Social Security Number (xxxx) and Date of Birth (mm/dd/yyyy) as indicated and click Submit. You will be taken to the next sign-up page. 5. Create a Crowdpark ID. This will be your Crowdpark login ID and cannot be changed, so think of one that is secure and easy to remember. 6. Create a Crowdpark password. You can change your password at any time. 7. Enter your Password Reset Question and Answer.  This can be used at a later time if you forget your password. 8. Enter your e-mail address. You will receive e-mail notification when new information is available in [address] 9. Click Sign Up. You can now view and download portions of your medical record. 10. Click the Download Summary menu link to download a portable copy of your medical information. If you have questions, please visit the Frequently Asked Questions section of the [employer] website. Remember, [employer] is NOT to be used for urgent needs. For medical emergencies, dial 911. Now available from your iPhone and Android! Please provide this summary of care documentation to your next provider. Your primary care clinician is listed as Jair Rosenberg. If you have any questions after today's visit, please call [phone].

## 2018-03-27 NOTE — PROGRESS NOTES
Chief Complaint   Patient presents with    Anxiety     Follow up. Room 7     1. Have you been to the ER, urgent care clinic since your last visit? Hospitalized since your last visit? No    2. Have you seen or consulted any other health care providers outside of the [address] [place] [address] since your last visit? Include any pap smears or colon screening.  No     Health Maintenance Due   Topic Date Due    Pneumococcal 19-64 Medium Risk (1 of 1 - PPSV23) 07/29/2003

## 2018-03-27 NOTE — PROGRESS NOTES
HISTORY OF PRESENT ILLNESS  Nisha Villegas is a 34 y.o. male. Chief Complaint   Patient presents with    Anxiety     Follow up. Room 7     HPI  1) Anxiety - Taking Valium 10 mg half tablet TID every day. Morning dose 5-6 AM, second dose at noon, and third dose 3-4 PM.      Getting #45 per month. Last filled 2/28/18. Feels that this is not entirely controlling anxiety. Notes that his anxiety was previously well controlled with marijuana daily, but has significantly cut down on marijuana use since moving to [place]. Trying mindfullness to control panic attacks daily. Yoga regularly    Tried Celexa x 1 month - felt very sleepy. Tried Klonopin/Xanax - \"felt high\" on it. Tried Wellbutrin in the past - also felt sleepy. Review of Systems   Psychiatric/Behavioral: Negative for depression. The patient is nervous/anxious. Physical Exam   Constitutional: He is oriented to person, place, and time. He appears well-developed and well-nourished. No distress. HENT:   Head: Normocephalic and atraumatic. Neck: Neck supple. No JVD present. Cardiovascular: Normal rate, regular rhythm and normal heart sounds. Pulmonary/Chest: Effort normal and breath sounds normal. No respiratory distress. Musculoskeletal: He exhibits no edema. Neurological: He is alert and oriented to person, place, and time. Skin: Skin is warm and dry. Psychiatric: He has a normal mood and affect. His behavior is normal. Judgment and thought content normal.   Nursing note and vitals reviewed. ASSESSMENT and PLAN    ICD-10-CM ICD-9-CM    1. Generalized anxiety disorder F41.1 300.02 diazePAM (VALIUM) 10 mg tablet      BENZODIAZEPINE, UR, CONFIRM   Suggested a trial of Effexor to help control anxiety. Pt plans to consider this. Follow up in 3 months.

## 2018-04-03 LAB — BENZODIAZ UR QL: POSITIVE

## 2018-07-09 ENCOUNTER — OFFICE VISIT (OUTPATIENT)
Dept: INTERNAL MEDICINE CLINIC | Facility: CLINIC | Age: 33
End: 2018-07-09

## 2018-07-09 VITALS
RESPIRATION RATE: 18 BRPM | TEMPERATURE: 98.4 F | HEIGHT: 70 IN | DIASTOLIC BLOOD PRESSURE: 78 MMHG | SYSTOLIC BLOOD PRESSURE: 120 MMHG | WEIGHT: 130 LBS | HEART RATE: 88 BPM | BODY MASS INDEX: 18.61 KG/M2

## 2018-07-09 DIAGNOSIS — F43.10 PTSD (POST-TRAUMATIC STRESS DISORDER): Primary | ICD-10-CM

## 2018-07-09 DIAGNOSIS — F41.1 GENERALIZED ANXIETY DISORDER: ICD-10-CM

## 2018-07-09 RX ORDER — DIAZEPAM 10 MG/1
TABLET ORAL
Qty: 45 TAB | Refills: 2 | Status: SHIPPED | OUTPATIENT
Start: 2018-07-09 | End: 2018-10-19 | Stop reason: SDUPTHER

## 2018-07-09 NOTE — MR AVS SNAPSHOT
[address] [place] [address] 
[phone] Patient: Sajan Andujar MRN: [id number] :1985 Visit Information Date & Time Provider Department Dept. Phone Encounter #  
 2018  2:45 PM Nela Garcia PA-C [facility] Internal Medicine [phone] [id number] Follow-up Instructions Return in about 3 months (around 10/9/2018) for Anxiety follow up. Upcoming Health Maintenance Date Due Pneumococcal 19-64 Medium Risk (1 of 1 - PPSV23) 2003 Influenza Age 5 to Adult 2018 DTaP/Tdap/Td series (2 - Td) 2/3/2027 Allergies as of 2018  Review Complete On: 2018 By: Umesh Lorenzo LPN No Known Allergies Current Immunizations  Reviewed on 2/3/2017 Name Date Tdap 2/3/2017 Not reviewed this visit You Were Diagnosed With   
  
 Codes Comments PTSD (post-traumatic stress disorder)    -  Primary ICD-10-CM: F43.10 ICD-9-CM: 309.81 Generalized anxiety disorder     ICD-10-CM: F41.1 ICD-9-CM: 300.02 Vitals BP Pulse Temp Resp Height(growth percentile) Weight(growth percentile) 120/78 88 98.4 °F (36.9 °C) (Oral) 18 5' 10\" (1.778 m) 130 lb (59 kg) BMI Smoking Status 18.65 kg/m2 Never Smoker Vitals History BMI and BSA Data Body Mass Index Body Surface Area  
 18.65 kg/m 2 1.71 m 2 Preferred Pharmacy Pharmacy Name Phone [facility]/PHARMACY #[id number], [place] - [address] [phone] Your Updated Medication List  
  
   
This list is accurate as of 18  3:13 PM.  Always use your most recent med list.  
  
  
  
  
 cetirizine 10 mg tablet Commonly known as:  ZYRTEC Take 1 Tab by mouth nightly. diazePAM 10 mg tablet Commonly known as:  VALIUM Take a 0.5 tab as needed for anxiety two times a day.  You may take an additional 0.5 tab as needed for days when the anxiety is not well controlled. Max: 45 tab per month. fluticasone 50 mcg/actuation nasal spray Commonly known as:  Adrian [place] 2 Sprays by Both Nostrils route daily. ketoconazole 2 % topical cream  
Commonly known as:  NIZORAL Apply  to affected area daily. Prescriptions Printed Refills  
 diazePAM (VALIUM) 10 mg tablet 2 Sig: Take a 0.5 tab as needed for anxiety two times a day. You may take an additional 0.5 tab as needed for days when the anxiety is not well controlled. Max: 45 tab per month. Class: Print Follow-up Instructions Return in about 3 months (around 10/9/2018) for Anxiety follow up. Patient Instructions PRESCRIPTION REFILL POLICY Effective 01/10/16 In an effort to ensure that the large volume of prescription refills are processed in the most efficient and expeditious manner, we are asking our patients to assist us by calling your pharmacy for all prescription refills. This will include Mail Order Pharmacies. The pharmacy will contact our office electronically to continue the refill process. Please do not wait until the last minute to call your pharmacy. We require 72 hours (3 days) to fill prescriptions. We also encourage you to call your pharmacy before picking up your prescription to make sure it is ready. With regard to controlled substance refill request (narcotics and many ADD/ADHD treatment prescriptions) and any other prescriptions that need to be picked up at our office, we ask your cooperation by providing us with at least 72 hours (3 days) notice prior to your need of the prescription. You will need to show a valid ID when picking up your prescription. Anyone delegated by you to  your prescriptions must be listed be listed on you HIPPA authorization form, and show a valid ID. Narcotics will not be refilled on a weekend or on a holiday in which the office is closed. We also encourage an alternative method of refill requests, which is through our medically secure web portal, [employer]. This is an efficient and effective way to communicate your requests directly with the office and provider. [facility] can be downloaded onto your smartphone as an Eidnson. If you are ready to be connected, please review the attached instructions or speak to any of our staff to get you set up right away! Thank you so much for your cooperation. Should you have any questions, please contact our Practice Administrator, Nadir Hood [phone]. Introducing [facility]! Yesica Garcia introduces [facility] patient portal. Now you can access parts of your medical record, email your doctor's office, and request medication refills online. 1. In your internet browser, go to [email or web address] 2. Click on the First Time User? Click Here link in the Sign In box. You will see the New Member Sign Up page. 3. Enter your [facility] Access Code exactly as it appears below. You will not need to use this code after youve completed the sign-up process. If you do not sign up before the expiration date, you must request a new code. · [facility] Access Code: [email or web address] Expires: 10/7/2018  3:13 PM 
 
4. Enter the last four digits of your Social Security Number (xxxx) and Date of Birth (mm/dd/yyyy) as indicated and click Submit. You will be taken to the next sign-up page. 5. Create a Bootstrap Softwaret ID. This will be your [facility] login ID and cannot be changed, so think of one that is secure and easy to remember. 6. Create a [facility] password. You can change your password at any time. 7. Enter your Password Reset Question and Answer. This can be used at a later time if you forget your password. 8. Enter your e-mail address. You will receive e-mail notification when new information is available in [address] 9. Click Sign Up. You can now view and download portions of your medical record. 10. Click the Download Summary menu link to download a portable copy of your medical information. If you have questions, please visit the Frequently Asked Questions section of the [employer] website. Remember, [employer] is NOT to be used for urgent needs. For medical emergencies, dial 911. Now available from your iPhone and Android! Please provide this summary of care documentation to your next provider. Your primary care clinician is listed as Malika Alvarez. If you have any questions after today's visit, please call [phone].

## 2018-07-09 NOTE — PROGRESS NOTES
HISTORY OF PRESENT ILLNESS  Vickie Mcdowell is a 34 y.o. male. Chief Complaint   Patient presents with    Follow-up     Medication     Health Maintenance Due   Topic Date Due    Pneumococcal 19-64 Medium Risk (1 of 1 - PPSV23) 07/29/2003     HPI  Anxiety/PTSD -   At pt's last visit 3 months ago, discussed possibility of adding Effexor to Valium for control. Pt researched this and is not interested in trying it because of potential s/e of Effexor. Feels that Valium is working well. Notes understanding that Valium is becoming more tightly regulated due to legislative pressure. Review of Systems   Constitutional: Negative for malaise/fatigue. Psychiatric/Behavioral: Negative for depression. The patient is nervous/anxious. Physical Exam   Constitutional: He is oriented to person, place, and time. He appears well-developed and well-nourished. No distress. HENT:   Head: Normocephalic and atraumatic. Neck: Neck supple. No JVD present. Cardiovascular: Normal rate, regular rhythm and normal heart sounds. Pulmonary/Chest: Effort normal and breath sounds normal. No respiratory distress. Musculoskeletal: He exhibits no edema. Neurological: He is alert and oriented to person, place, and time. Skin: Skin is warm and dry. Psychiatric: He has a normal mood and affect. His behavior is normal. Judgment and thought content normal.   Nursing note and vitals reviewed. ASSESSMENT and PLAN    ICD-10-CM ICD-9-CM    1. PTSD (post-traumatic stress disorder) F43.10 309.81 See below. Pt is uninterested in alternative therapies. 2. Generalized anxiety disorder F41.1 300.02 diazePAM (VALIUM) 10 mg tablet refill written x 3 months.

## 2018-07-09 NOTE — PATIENT INSTRUCTIONS
PRESCRIPTION REFILL POLICY  Effective 98/52/66    In an effort to ensure that the large volume of prescription refills are processed in the most efficient and expeditious manner, we are asking our patients to assist us by calling your pharmacy for all prescription refills. This will include Mail Order Pharmacies. The pharmacy will contact our office electronically to continue the refill process. Please do not wait until the last minute to call your pharmacy. We require 72 hours (3 days) to fill prescriptions. We also encourage you to call your pharmacy before picking up your prescription to make sure it is ready. With regard to controlled substance refill request (narcotics and many ADD/ADHD treatment prescriptions) and any other prescriptions that need to be picked up at our office, we ask your cooperation by providing us with at least 72 hours (3 days) notice prior to your need of the prescription. You will need to show a valid ID when picking up your prescription. Anyone delegated by you to  your prescriptions must be listed be listed on you HIPPA authorization form, and show a valid ID. Narcotics will not be refilled on a weekend or on a holiday in which the office is closed. We also encourage an alternative method of refill requests, which is through our medically secure web portal, [employer]. This is an efficient and effective way to communicate your requests directly with the office and provider. [employer] can be downloaded onto your smartphone as an [facility]. If you are ready to be connected, please review the attached instructions or speak to any of our staff to get you set up right away! Thank you so much for your cooperation. Should you have any questions, please contact our Practice Administrator, Stefani Smith [phone].

## 2018-07-09 NOTE — PROGRESS NOTES
Chief Complaint   Patient presents with    Follow-up     Medication     1. Have you been to the ER, urgent care clinic since your last visit? Hospitalized since your last visit? No    2. Have you seen or consulted any other health care providers outside of the [address] [place] [address] since your last visit? Include any pap smears or colon screening.  No

## 2018-10-19 ENCOUNTER — OFFICE VISIT (OUTPATIENT)
Dept: INTERNAL MEDICINE CLINIC | Facility: CLINIC | Age: 33
End: 2018-10-19

## 2018-10-19 VITALS
TEMPERATURE: 98.2 F | BODY MASS INDEX: 19.33 KG/M2 | SYSTOLIC BLOOD PRESSURE: 112 MMHG | WEIGHT: 135 LBS | HEART RATE: 87 BPM | DIASTOLIC BLOOD PRESSURE: 72 MMHG | RESPIRATION RATE: 18 BRPM | HEIGHT: 70 IN

## 2018-10-19 DIAGNOSIS — F41.1 GENERALIZED ANXIETY DISORDER: ICD-10-CM

## 2018-10-19 DIAGNOSIS — F43.10 PTSD (POST-TRAUMATIC STRESS DISORDER): Primary | ICD-10-CM

## 2018-10-19 RX ORDER — DIAZEPAM 10 MG/1
TABLET ORAL
Qty: 45 TAB | Refills: 2 | Status: SHIPPED | OUTPATIENT
Start: 2018-10-19 | End: 2018-10-19 | Stop reason: SDUPTHER

## 2018-10-19 RX ORDER — DIAZEPAM 10 MG/1
TABLET ORAL
Qty: 45 TAB | Refills: 2 | Status: SHIPPED | OUTPATIENT
Start: 2018-12-19 | End: 2019-03-19 | Stop reason: SDUPTHER

## 2018-10-19 RX ORDER — DIAZEPAM 10 MG/1
TABLET ORAL
Qty: 45 TAB | Refills: 2 | Status: SHIPPED | OUTPATIENT
Start: 2018-11-19 | End: 2018-10-19 | Stop reason: SDUPTHER

## 2018-10-19 NOTE — PROGRESS NOTES
Chief Complaint   Patient presents with    Medication Evaluation     valium(Declines flu shot)      1. Have you been to the ER, urgent care clinic since your last visit? Hospitalized since your last visit? No    2. Have you seen or consulted any other health care providers outside of the [address] [place] [address] since your last visit? Include any pap smears or colon screening.  No

## 2018-10-19 NOTE — PROGRESS NOTES
Subjective:      Nola Phoenix is a 28 y.o. male who presents today for medications. Mental Health Review  Patient is seen for PTSD. Since last visit: no changes. Ongoing symptoms include: racing thoughts, feelings of losing control. He denies: SI/SA. Reported side effects from the treatment: none. He has been stable on valium    Patient Active Problem List    Diagnosis Date Noted    Acute allergic rhinitis 12/22/2017    History of hepatitis C 02/03/2017    Arthritis 01/06/2017    PTSD (post-traumatic stress disorder) 10/25/2016    Generalized anxiety disorder 10/25/2016     Current Outpatient Medications   Medication Sig Dispense Refill    diazePAM (VALIUM) 10 mg tablet Take a 0.5 tab as needed for anxiety two times a day. You may take an additional 0.5 tab as needed for days when the anxiety is not well controlled. Max: 45 tab per month. 45 Tab 2    cetirizine (ZYRTEC) 10 mg tablet Take 1 Tab by mouth nightly. 60 Tab 6    fluticasone (FLONASE) 50 mcg/actuation nasal spray 2 Sprays by Both Nostrils route daily. 1 Bottle 11    ketoconazole (NIZORAL) 2 % topical cream Apply  to affected area daily. 15 g 0     No Known Allergies  History reviewed. No pertinent past medical history. Review of Systems    A comprehensive review of systems was negative except for that written in the HPI. Objective:     Visit Vitals  /72   Pulse 87   Temp 98.2 °F (36.8 °C) (Oral)   Resp 18   Ht 5' 10\" (1.778 m)   Wt 135 lb (61.2 kg)   BMI 19.37 kg/m²     General appearance: alert, cooperative, no distress, appears stated age  Head: Normocephalic, without obvious abnormality, atraumatic  Eyes: negative  Lungs: clear to auscultation bilaterally  Heart: regular rate and rhythm, S1, S2 normal, no murmur, click, rub or gallop  Neurologic: Grossly normal  Psych: appropriate mood, speech, affect  Nursing note and vitals reviewed  Assessment/Plan:       ICD-10-CM ICD-9-CM    1.  PTSD (post-traumatic stress disorder) F43.10 309.81    2. Generalized anxiety disorder F41.1 300.02 diazePAM (VALIUM) 10 mg tablet      DISCONTINUED: diazePAM (VALIUM) 10 mg tablet      DISCONTINUED: diazePAM (VALIUM) 10 mg tablet   3 months script given    Follow-up Disposition:  Return in about 3 months (around 1/19/2019) for Anxiety. Advised him to call back or return to office if symptoms worsen/change/persist.  Discussed expected course/resolution/complications of diagnosis in detail with patient. Medication risks/benefits/costs/interactions/alternatives discussed with patient. He was given an after visit summary which includes diagnoses, current medications, & vitals. He expressed understanding with the diagnosis and plan.

## 2019-01-12 DIAGNOSIS — J30.9 ACUTE ALLERGIC RHINITIS: ICD-10-CM

## 2019-01-14 RX ORDER — FLUTICASONE PROPIONATE 50 MCG
SPRAY, SUSPENSION (ML) NASAL
Qty: 1 BOTTLE | Refills: 5 | Status: SHIPPED | OUTPATIENT
Start: 2019-01-14 | End: 2019-11-17 | Stop reason: SDUPTHER

## 2019-03-19 ENCOUNTER — OFFICE VISIT (OUTPATIENT)
Dept: INTERNAL MEDICINE CLINIC | Facility: CLINIC | Age: 34
End: 2019-03-19

## 2019-03-19 VITALS
DIASTOLIC BLOOD PRESSURE: 77 MMHG | BODY MASS INDEX: 18.61 KG/M2 | HEIGHT: 70 IN | WEIGHT: 130 LBS | HEART RATE: 91 BPM | RESPIRATION RATE: 16 BRPM | TEMPERATURE: 98.5 F | SYSTOLIC BLOOD PRESSURE: 119 MMHG

## 2019-03-19 DIAGNOSIS — B35.6 TINEA CRURIS: Primary | ICD-10-CM

## 2019-03-19 DIAGNOSIS — F41.1 GENERALIZED ANXIETY DISORDER: ICD-10-CM

## 2019-03-19 RX ORDER — FLUCONAZOLE 150 MG/1
150 TABLET ORAL
Qty: 2 TAB | Refills: 0 | Status: SHIPPED | OUTPATIENT
Start: 2019-03-19 | End: 2019-03-27

## 2019-03-19 RX ORDER — PRENATAL VIT 91/IRON/FOLIC/DHA 28-975-200
COMBINATION PACKAGE (EA) ORAL 2 TIMES DAILY
Qty: 30 G | Refills: 0 | Status: SHIPPED | OUTPATIENT
Start: 2019-03-19

## 2019-03-19 RX ORDER — DIAZEPAM 10 MG/1
TABLET ORAL
Qty: 45 TAB | Refills: 2 | Status: SHIPPED | OUTPATIENT
Start: 2019-03-19 | End: 2019-04-30 | Stop reason: SDUPTHER

## 2019-03-19 NOTE — PROGRESS NOTES
Chief Complaint   Patient presents with    Rash     rash in groin area.  Medication Refill     valium. 1. Have you been to the ER, urgent care clinic since your last visit? Hospitalized since your last visit? No    2. Have you seen or consulted any other health care providers outside of the [address] [place] [address] since your last visit? Include any pap smears or colon screening.  No

## 2019-03-19 NOTE — PROGRESS NOTES
Subjective:      Bayron Gillette is a 28 y.o. male who presents today for rash. Rash: noted to groin since 2-3 weeks ago. He notes groin it itching, red. He denies pain. He has used neosporin and steroids that have helped minimally. Mental Health Review  Patient is seen for anxiety disorder. Since last visit: no change, valium still controls symptoms. He denies: SI/SA. Reported side effects from the treatment: none. Patient Active Problem List    Diagnosis Date Noted    Acute allergic rhinitis 12/22/2017    History of hepatitis C 02/03/2017    Arthritis 01/06/2017    PTSD (post-traumatic stress disorder) 10/25/2016    Generalized anxiety disorder 10/25/2016     Current Outpatient Medications   Medication Sig Dispense Refill    fluticasone (FLONASE) 50 mcg/actuation nasal spray INHALE 2 SPRAYS BY BOTH NOSTRILS ROUTE DAILY. 1 Bottle 5    diazePAM (VALIUM) 10 mg tablet Take a 0.5 tab as needed for anxiety two times a day. You may take an additional 0.5 tab as needed for days when the anxiety is not well controlled. Max: 45 tab per month. 45 Tab 2    cetirizine (ZYRTEC) 10 mg tablet Take 1 Tab by mouth nightly. 60 Tab 6    ketoconazole (NIZORAL) 2 % topical cream Apply  to affected area daily. 15 g 0     No Known Allergies  History reviewed. No pertinent past medical history. Past Surgical History:   Procedure Laterality Date    HX ADENOIDECTOMY      HX HEENT      lymph nodes removed from neck    HX TONSIL AND ADENOIDECTOMY      HX TONSILLECTOMY      HX TYMPANOSTOMY          Review of Systems    A comprehensive review of systems was negative except for that written in the HPI.      Objective:     Visit Vitals  /77   Pulse 91   Temp 98.5 °F (36.9 °C) (Oral)   Resp 16   Ht 5' 10\" (1.778 m)   Wt 130 lb (59 kg)   BMI 18.65 kg/m²     General appearance: alert, cooperative, no distress, appears stated age  Head: Normocephalic, without obvious abnormality, atraumatic  Eyes: negative  Lungs: clear to auscultation bilaterally  Chest wall: no tenderness  Heart: regular rate and rhythm, S1, S2 normal, no murmur, click, rub or gallop  Male genitalia: abnormal findings: erythema and rash noted  Pulses: 2+ and symmetric  Skin: maculopapular rash noted to groin in line with tinea cruris  Neurologic: Grossly normal  Psych: appropriate mood, speech, affect    Nursing note and vitals reviewed  Assessment/Plan:       ICD-10-CM ICD-9-CM    1. Tinea cruris B35.6 110.3 fluconazole (DIFLUCAN) 150 mg tablet      terbinafine HCl (LAMISIL) 1 % topical cream   2. Generalized anxiety disorder F41.1 300.02 diazePAM (VALIUM) 10 mg tablet       Follow-up Disposition:  Return if symptoms worsen or fail to improve. Advised him to call back or return to office if symptoms worsen/change/persist.  Discussed expected course/resolution/complications of diagnosis in detail with patient. Medication risks/benefits/costs/interactions/alternatives discussed with patient. He was given an after visit summary which includes diagnoses, current medications, & vitals. He expressed understanding with the diagnosis and plan.

## 2019-03-19 NOTE — PATIENT INSTRUCTIONS
Jock Itch: Care Instructions  Your Care Instructions  Jock itch is a fungal infection of the groin. The fungus that causes jock itch lives on your skin. It often affects male athletes, but anyone can get jock itch. You may get an itchy rash on your inner thighs and rear end (buttocks). It spreads and starts to itch when you sweat or are in steamy showers or locker rooms. Jock itch should end soon if you keep your skin dry after you clean it. You can treat jock itch at home with antifungal creams and powders that you can buy without a prescription. Follow-up care is a key part of your treatment and safety. Be sure to make and go to all appointments, and call your doctor if you are having problems. It's also a good idea to know your test results and keep a list of the medicines you take. How can you care for yourself at home? · Wash the rash with soap and water. · Wet a soft cloth with cool water alone or mixed with baking soda or essential oils such as lavender or parul. Put the cool compress on the skin to relieve itching. · If you have large areas of blisters or sores, use compresses such as those made with Burow's solution (available without a prescription) to soothe and dry out the blisters. · Spread antifungal cream or powder over, and beyond the edge of, the rash. Follow the directions on the package. · If your doctor prescribed medicine, take it exactly as directed. Call your doctor if you have any problems with your medicine. · Try not to scratch the rash. · Shower or bathe daily and after you exercise. · Keep your skin dry as much as possible to allow it to heal.  · Until your jock itch is cured, wear loose-fitting cotton clothing. Avoid tight underwear, pants, and panty hose. · Wash your supporters and shorts after every wearing. · Do not share clothing, sports equipment, towels, or sheets to avoid spreading the fungi to other people.   To prevent jock itch  · Put on socks before you put on underwear if you have athlete's foot. This action helps prevent the fungus on your feet from spreading to your groin. · Wash your workout clothes, underwear, socks, and towels after each use. · Keep your groin, inner thighs, and buttocks clean and dry, especially after you exercise and shower. · Use a powder on your groin, inner thighs, and buttocks to help keep these areas dry. · Do not borrow or lend clothing, sports equipment, towels, or sheets. · Wear slippers or sandals in locker rooms, showers, and public bathing areas. When should you call for help? Call your doctor now or seek immediate medical care if:    · You have signs of infection, such as:  ? Increased pain, swelling, warmth, or redness. ? Red streaks leading from the rash. ? Pus draining from the rash. ? A fever.    Watch closely for changes in your health, and be sure to contact your doctor if:    · You do not get better as expected. Where can you learn more? Go to http://gretta-barrie.info/. Enter G303 in the search box to learn more about \"Jock Itch: Care Instructions. \"  Current as of: April 17, 2018  Content Version: 11.9  © 5168-9476 [employer]. Care instructions adapted under license by [employer] (which disclaims liability or warranty for this information). If you have questions about a medical condition or this instruction, always ask your healthcare professional. [place] [address] any warranty or liability for your use of this information.

## 2019-04-30 DIAGNOSIS — F41.1 GENERALIZED ANXIETY DISORDER: ICD-10-CM

## 2019-04-30 RX ORDER — DIAZEPAM 10 MG/1
TABLET ORAL
Qty: 45 TAB | Refills: 2 | Status: SHIPPED | OUTPATIENT
Start: 2019-06-18

## 2019-11-17 DIAGNOSIS — J30.9 ACUTE ALLERGIC RHINITIS: ICD-10-CM

## 2019-11-18 RX ORDER — FLUTICASONE PROPIONATE 50 MCG
SPRAY, SUSPENSION (ML) NASAL
Qty: 1 BOTTLE | Refills: 0 | Status: SHIPPED | OUTPATIENT
Start: 2019-11-18 | End: 2019-12-14 | Stop reason: SDUPTHER

## 2019-11-18 NOTE — TELEPHONE ENCOUNTER
Last refill- 1/14/19  Last office visit - 10/19/18  Next office visit - No future appointments. Ad[facility] patient [facility] Requested Prescriptions     Pending Prescriptions Disp Refills    fluticasone propionate (FLONASE) 50 mcg/actuation nasal spray [Pharmacy Med Name: FLUTICASONE PROP 50 MCG SPRAY] 1 Bottle 3     Sig: INHALE 2 SPRAYS BY BOTH NOSTRILS ROUTE DAILY.
